# Patient Record
Sex: FEMALE | Race: BLACK OR AFRICAN AMERICAN | Employment: UNEMPLOYED | URBAN - METROPOLITAN AREA
[De-identification: names, ages, dates, MRNs, and addresses within clinical notes are randomized per-mention and may not be internally consistent; named-entity substitution may affect disease eponyms.]

---

## 2017-06-01 ENCOUNTER — OFFICE VISIT (OUTPATIENT)
Dept: FAMILY MEDICINE CLINIC | Age: 57
End: 2017-06-01

## 2017-06-01 VITALS
DIASTOLIC BLOOD PRESSURE: 85 MMHG | BODY MASS INDEX: 41.48 KG/M2 | TEMPERATURE: 98.2 F | HEIGHT: 65 IN | WEIGHT: 249 LBS | SYSTOLIC BLOOD PRESSURE: 132 MMHG

## 2017-06-01 DIAGNOSIS — E11.9 CONTROLLED TYPE 2 DIABETES MELLITUS WITHOUT COMPLICATION, WITHOUT LONG-TERM CURRENT USE OF INSULIN (HCC): ICD-10-CM

## 2017-06-01 DIAGNOSIS — I10 ESSENTIAL HYPERTENSION: Primary | ICD-10-CM

## 2017-06-01 DIAGNOSIS — E78.00 PURE HYPERCHOLESTEROLEMIA: ICD-10-CM

## 2017-06-01 RX ORDER — ATORVASTATIN CALCIUM 80 MG/1
40 TABLET, FILM COATED ORAL DAILY
Qty: 90 TAB | Refills: 3 | Status: SHIPPED | OUTPATIENT
Start: 2017-06-01 | End: 2018-03-07 | Stop reason: SDUPTHER

## 2017-06-01 RX ORDER — AMLODIPINE BESYLATE 5 MG/1
5 TABLET ORAL DAILY
Qty: 30 TAB | Refills: 3 | Status: SHIPPED | OUTPATIENT
Start: 2017-06-01 | End: 2017-07-26 | Stop reason: ALTCHOICE

## 2017-06-01 NOTE — MR AVS SNAPSHOT
Visit Information Date & Time Provider Department Dept. Phone Encounter #  
 6/1/2017  3:15 PM Leonila Olivas, [address] [place] [address] Avenue [phone] [id number] Follow-up Instructions Return in about 8 weeks (around 7/27/2017). 6/7/2017 10:00 AM  
EWL OVER 50 with [place]  [facility] ([address]) [address] [facility] [address] [facility] [address]  
[phone]  
  
   
 [address] [facility]   [facility] [address] [facility] Upcoming Health Maintenance Date Due  
 EYE EXAM RETINAL OR DILATED Q1 5/5/1968 Pneumococcal 19-64 Medium Risk (1 of 1 - PPSV23) 5/5/1977 HEMOGLOBIN A1C Q6M 10/11/2016 MICROALBUMIN Q1 3/10/2017 FOOT EXAM Q1 4/11/2017 LIPID PANEL Q1 4/11/2017 FOBT Q 1 YEAR AGE 50-75 4/11/2017 INFLUENZA AGE 9 TO ADULT 8/1/2017 BREAST CANCER SCRN MAMMOGRAM 3/2/2018 PAP AKA CERVICAL CYTOLOGY 2/16/2019 DTaP/Tdap/Td series (2 - Td) 12/14/2025 Allergies as of 6/1/2017  Review Complete On: 6/1/2017 By: Bala Torres No Known Allergies Current Immunizations  Reviewed on 12/14/2015 Name Date Influenza Vaccine (Quad) PF 12/14/2015 Tdap 12/14/2015 Not reviewed this visit You Were Diagnosed With   
  
 Codes Comments Essential hypertension    -  Primary ICD-10-CM: I10 
ICD-9-CM: 401.9 Pure hypercholesterolemia     ICD-10-CM: E78.00 ICD-9-CM: 272.0 Vitals BP Temp Height(growth percentile) Weight(growth percentile) BMI OB Status 132/85 (BP 1 Location: Left arm, BP Patient Position: Sitting) 98.2 °F (36.8 °C) (Oral) 5' 4.57\" (1.64 m) 249 lb (112.9 kg) 41.99 kg/m2 Postmenopausal  
 Smoking Status Never Smoker Vitals History BMI and BSA Data Body Mass Index Body Surface Area 41.99 kg/m 2 2.27 m 2 Preferred Pharmacy Pharmacy Name Phone  [facility] [address] [phone] Your Updated Medication List  
  
   
This list is accurate as of: 6/1/17  4:25 PM.  Always use your most recent med list. amLODIPine 5 mg tablet Commonly known as:  [place] Coop Take 1 Tab by mouth daily. atorvastatin 80 mg tablet Commonly known as:  LIPITOR Take 0.5 Tabs by mouth daily. Prescriptions Sent to Pharmacy Refills  
 amLODIPine (NORVASC) 5 mg tablet 3 Sig: Take 1 Tab by mouth daily. Class: Normal  
 Pharmacy: [place] [address] [address] [facility] #: [phone] Route: Oral  
 atorvastatin (LIPITOR) 80 mg tablet 3 Sig: Take 0.5 Tabs by mouth daily. Class: Normal  
 Pharmacy: [place] [address] [address] [facility] #: [phone] Route: Oral  
  
Follow-up Instructions Return in about 8 weeks (around 7/27/2017). Patient Instructions Amlodipine (By mouth) Amlodipine (pr-INF-fu-peen) Treats high blood pressure and angina (chest pain). This medicine is a calcium channel blocker. Brand Name(s): Norvasc There may be other brand names for this medicine. When This Medicine Should Not Be Used: This medicine is not right for everyone. Do not use it if you had an allergic reaction to amlodipine. How to Use This Medicine:  
Tablet, Dissolving Tablet · Take your medicine as directed. Your dose may need to be changed several times to find what works best for you. Take this medicine at the same time each day. · Read and follow the patient instructions that come with this medicine. Talk to your doctor or pharmacist if you have any questions. · Missed dose: Take a dose as soon as you remember. If it has been more than 12 hours since you were supposed to take your dose, skip the missed dose and take your next regular dose at the regular time.  
· Store the medicine in a closed container at room temperature, away from heat, moisture, and direct light. Drugs and Foods to Avoid: Ask your doctor or pharmacist before using any other medicine, including over-the-counter medicines, vitamins, and herbal products. · Some medicines can affect how amlodipine works. Tell your doctor if you are also using any of the following: ¨ Clarithromycin, cyclosporine, diltiazem, itraconazole, ritonavir, sildenafil, simvastatin, tacrolimus Warnings While Using This Medicine: · Tell your doctor if you are pregnant or breastfeeding, or if you have liver disease, heart disease, coronary artery disease, or aortic stenosis. · This medicine could lower your blood pressure too much, especially when you first use it or if you are dehydrated. Stand or sit up slowly if you feel lightheaded or dizzy. · Your doctor will check your progress and the effects of this medicine at regular visits. Keep all appointments. · Do not stop using this medicine without asking your doctor, even if you feel well. This medicine will not cure high blood pressure, but it will help keep it in normal range. You may have to take blood pressure medicine for the rest of your life. · Keep all medicine out of the reach of children. Never share your medicine with anyone. Possible Side Effects While Using This Medicine:  
Call your doctor right away if you notice any of these side effects: · Allergic reaction: Itching or hives, swelling in your face or hands, swelling or tingling in your mouth or throat, chest tightness, trouble breathing · Lightheadedness, dizziness · New or worsening chest pain · Swelling in your hands, ankles, or legs · Trouble breathing, nausea, unusual sweating, fainting If you notice other side effects that you think are caused by this medicine, tell your doctor. Call your doctor for medical advice about side effects. You may report side effects to FDA at 8-284-FDA-8252 © 2017 [address] Information is for End User's use only and may not be sold, redistributed or otherwise used for commercial purposes. The above information is an  only. It is not intended as medical advice for individual conditions or treatments. Talk to your doctor, nurse or pharmacist before following any medical regimen to see if it is safe and effective for you. Introducing [facility]! Booker Bnez introduces [employer] patient portal. Now you can access parts of your medical record, email your doctor's office, and request medication refills online. 1. In your internet browser, go to [email or web address] 2. Click on the First Time User? Click Here link in the Sign In box. You will see the New Member Sign Up page. 3. Enter your [employer] Access Code exactly as it appears below. You will not need to use this code after youve completed the sign-up process. If you do not sign up before the expiration date, you must request a new code. · [employer] Access Code: [email or web address] Expires: 6/26/2017  1:54 PM 
 
4. Enter the last four digits of your Social Security Number (xxxx) and Date of Birth (mm/dd/yyyy) as indicated and click Submit. You will be taken to the next sign-up page. 5. Create a Un[employer] ID. This will be your [employer] login ID and cannot be changed, so think of one that is secure and easy to remember. 6. Create a [employer] password. You can change your password at any time. 7. Enter your Password Reset Question and Answer. This can be used at a later time if you forget your password. 8. Enter your e-mail address. You will receive e-mail notification when new information is available in [address] 9. Click Sign Up. You can now view and download portions of your medical record. 10. Click the Download Summary menu link to download a portable copy of your medical information.  
 
If you have questions, please visit the Frequently Asked Questions section of the [employer]. Remember, [employer] is NOT to be used for urgent needs. For medical emergencies, dial 911. Now available from your iPhone and Android! Please provide this summary of care documentation to your next provider. Your primary care clinician is listed as Ernst Araujo. If you have any questions after today's visit, please call [phone].

## 2017-06-01 NOTE — PATIENT INSTRUCTIONS
Amlodipine (By mouth)   Amlodipine (hg-IMH-my-peen)  Treats high blood pressure and angina (chest pain). This medicine is a calcium channel blocker. Brand Name(s): Norvasc   There may be other brand names for this medicine. When This Medicine Should Not Be Used: This medicine is not right for everyone. Do not use it if you had an allergic reaction to amlodipine. How to Use This Medicine:   Tablet, Dissolving Tablet  · Take your medicine as directed. Your dose may need to be changed several times to find what works best for you. Take this medicine at the same time each day. · Read and follow the patient instructions that come with this medicine. Talk to your doctor or pharmacist if you have any questions. · Missed dose: Take a dose as soon as you remember. If it has been more than 12 hours since you were supposed to take your dose, skip the missed dose and take your next regular dose at the regular time. · Store the medicine in a closed container at room temperature, away from heat, moisture, and direct light. Drugs and Foods to Avoid:   Ask your doctor or pharmacist before using any other medicine, including over-the-counter medicines, vitamins, and herbal products. · Some medicines can affect how amlodipine works. Tell your doctor if you are also using any of the following:   ¨ Clarithromycin, cyclosporine, diltiazem, itraconazole, ritonavir, sildenafil, simvastatin, tacrolimus  Warnings While Using This Medicine:   · Tell your doctor if you are pregnant or breastfeeding, or if you have liver disease, heart disease, coronary artery disease, or aortic stenosis. · This medicine could lower your blood pressure too much, especially when you first use it or if you are dehydrated. Stand or sit up slowly if you feel lightheaded or dizzy. · Your doctor will check your progress and the effects of this medicine at regular visits. Keep all appointments.   · Do not stop using this medicine without asking your doctor, even if you feel well. This medicine will not cure high blood pressure, but it will help keep it in normal range. You may have to take blood pressure medicine for the rest of your life. · Keep all medicine out of the reach of children. Never share your medicine with anyone. Possible Side Effects While Using This Medicine:   Call your doctor right away if you notice any of these side effects:  · Allergic reaction: Itching or hives, swelling in your face or hands, swelling or tingling in your mouth or throat, chest tightness, trouble breathing  · Lightheadedness, dizziness  · New or worsening chest pain  · Swelling in your hands, ankles, or legs  · Trouble breathing, nausea, unusual sweating, fainting  If you notice other side effects that you think are caused by this medicine, tell your doctor. Call your doctor for medical advice about side effects. You may report side effects to FDA at 0-022-FDA-1460  © 2017 [address] Information is for End User's use only and may not be sold, redistributed or otherwise used for commercial purposes. The above information is an  only. It is not intended as medical advice for individual conditions or treatments. Talk to your doctor, nurse or pharmacist before following any medical regimen to see if it is safe and effective for you.

## 2017-06-01 NOTE — PROGRESS NOTES
Coordination of Care  1. Have you been to the ER, urgent care clinic since your last visit? Hospitalized since your last visit? No    2. Have you seen or consulted any other health care providers outside of the [address] [place] [address] since your last visit? Include any pap smears or colon screening. No    Medications  Medication Reconciliation Performed: yes  Patient does need refills     Learning Assessment Complete?  yes

## 2017-06-01 NOTE — PROGRESS NOTES
Subjective:     Oscar Ho is a 59 y.o. female who presents for follow up of diabetes, hypertension and hyperlipidemia. Diet and Lifestyle: not attempting to follow a low fat, low cholesterol diet  Home BP Monitoring: is not measured at home    Cardiovascular ROS: taking medications as instructed, no medication side effects noted, no TIA's, no chest pain on exertion, no dyspnea on exertion, no swelling of ankles. New concerns:   Patient Active Problem List   Diagnosis Code    Hyperlipidemia E78.5    Essential hypertension I10    [facility] E66.01    Diabetes mellitus type 2, controlled ([facility] E11.9     Patient Active Problem List    Diagnosis Date Noted    Diabetes mellitus type 2, controlled ([facility] 01/08/2016    Morbid obesity ([facility] 12/15/2015    Hyperlipidemia 07/02/2015    Essential hypertension 07/02/2015     Current Outpatient Prescriptions   Medication Sig Dispense Refill    atorvastatin (LIPITOR) 80 mg tablet Take 0.5 Tabs by mouth daily. 90 Tab 3    hydrochlorothiazide (HYDRODIURIL) 25 mg tablet Take 1 Tab by mouth daily. 90 Tab 0     No Known Allergies     Lab Results   Component Value Date/Time    Sodium 142 04/11/2016 11:49 AM    Potassium 4.1 04/11/2016 11:49 AM    Chloride 103 04/11/2016 11:49 AM    CO2 31 04/11/2016 11:49 AM    Anion gap 8 04/11/2016 11:49 AM    Glucose 89 04/11/2016 11:49 AM    BUN 11 04/11/2016 11:49 AM    Creatinine 0.87 04/11/2016 11:49 AM    BUN/Creatinine ratio 13 04/11/2016 11:49 AM    GFR est AA >60 04/11/2016 11:49 AM    GFR est non-AA >60 04/11/2016 11:49 AM    Calcium 8.6 04/11/2016 11:49 AM    Bilirubin, total 0.4 01/07/2016 10:46 AM    ALT (SGPT) 22 01/07/2016 10:46 AM    AST (SGOT) 12 01/07/2016 10:46 AM    Alk.  phosphatase 77 01/07/2016 10:46 AM    Protein, total 7.6 01/07/2016 10:46 AM    Albumin 4.0 01/07/2016 10:46 AM    Globulin 3.6 01/07/2016 10:46 AM    A-G Ratio 1.1 01/07/2016 10:46 AM      Lab Results   Component Value Date/Time Hemoglobin A1c 6.3 04/11/2016 11:49 AM         Review of Systems, additional:  Pertinent items are noted in HPI. Objective:     Visit Vitals    /85 (BP 1 Location: Left arm, BP Patient Position: Sitting)    Temp 98.2 °F (36.8 °C) (Oral)    Ht 5' 4.57\" (1.64 m)    Wt 249 lb (112.9 kg)    BMI 41.99 kg/m2     Appearance: alert, well appearing, and in no distress, oriented to person, place, and time, overweight, acyanotic, in no respiratory distress, playful, active and well hydrated. General exam: CVS exam BP noted to be well controlled today in office, S1, S2 normal, no gallop, no murmur, chest clear, no JVD, no HSM, no edema. Lab review: orders written for new lab studies as appropriate; see orders. Assessment/Plan:     hypertension stable, asymptomatic, control uncertain, hyperlipidemia needs further observation. current treatment plan is effective, no change in therapy  orders and follow up as documented in patient record  reviewed diet, exercise and weight control  recommended sodium restriction. ICD-10-CM ICD-9-CM    1. Essential hypertension I10 401.9 amLODIPine (NORVASC) 5 mg tablet   2. Pure hypercholesterolemia E78.00 272.0 atorvastatin (LIPITOR) 80 mg tablet     BP at goal despite Pt having lack of HTN Rx for > 1-2 months. Refill Rx. Encouraged Pt/dtr to check BP at home/pharmacy > 2-3x week and bring log to f/u appt. Return for fasting labs prior to f/u appt. Pt expressed understanding of the treatment plan and repeated back instructions for medications, labs and follow-up.

## 2017-06-07 ENCOUNTER — HOSPITAL ENCOUNTER (OUTPATIENT)
Dept: MAMMOGRAPHY | Age: 57
Discharge: HOME | End: 2017-06-07

## 2017-06-07 ENCOUNTER — OFFICE VISIT (OUTPATIENT)
Dept: FAMILY PLANNING/WOMEN'S HEALTH CLINIC | Age: 57
End: 2017-06-07

## 2017-06-07 VITALS — SYSTOLIC BLOOD PRESSURE: 124 MMHG | DIASTOLIC BLOOD PRESSURE: 80 MMHG

## 2017-06-07 DIAGNOSIS — B37.31 VAGINAL YEAST INFECTION: Primary | ICD-10-CM

## 2017-06-07 DIAGNOSIS — Z12.31 VISIT FOR SCREENING MAMMOGRAM: ICD-10-CM

## 2017-06-07 PROCEDURE — 77067 SCR MAMMO BI INCL CAD: CPT

## 2017-06-07 RX ORDER — MICONAZOLE NITRATE 2 %
1 CREAM WITH APPLICATOR VAGINAL
Qty: 45 G | Refills: 0 | Status: SHIPPED | OUTPATIENT
Start: 2017-06-07 | End: 2018-03-07

## 2017-06-07 NOTE — PROGRESS NOTES
EVERY WOMANS LIFE HISTORY QUESTIONNAIRE       No Yes Comments   Has a doctor ever seen or felt anything wrong with your breast? [x]                                  []                                     Have you ever had a breast biopsy? [x]                                  []                                          When and where was last mammogram performed? 3/2016 with [facility]    Have you ever been told that there was a problem on your mammogram?   No Yes Comments   [x]                                  []                                       Do you have breast implants? No Yes Comments   [x]                                  []                                       When was your last Pap test performed? 2/2016 at [facility] you ever had an abnormal Pap test?   No Yes Comments   [x]                                  []                                       Have you had a hysterectomy? No Yes Comments (why)   [x]                                  []                                       Have you ever been diagnosed with any type of Cancer   No Yes Comments (type,when,where,type of treatment   [x]                                  []                                          Has a family member been diagnosed with breast or ovarian cancer? No Yes Comments (which family members, and type   [x]                                  []                                       Did your mother take JUVEANL? No Yes Unknown   [x]                                  []                                       Do you have a history of HIV exposure? No Yes    [x]                                  []                                         Have you been through menopause?    No Yes Date of LMP   []                                  [x]                                  2014     Are you taking hormone replacement therapy (HRT)     No Yes Comments   [x]                                  []                                     How many times have you been pregnant? 5      Number of live births ? 5    Are you experiencing any of the following? No Yes Comments   Nipple Discharge [x]                                  []                                     Breast Lump/Masses [x]                                  []                                     Breast Skin Changes [x]                                  []                                          No Yes Comments   Vaginal Discharge [x]                                  []                                     Abnormal/unusual vaginal bleeding [x]                                  []                                         Are you experiencing any other health problems?     HTN, high chol---followed at [facility]

## 2017-06-07 NOTE — PROGRESS NOTES
Assessment/Plan:    Dollie Sicard was seen today for well woman. Diagnoses and all orders for this visit:    Vaginal yeast infection  -     miconazole (MONISTAT 7) 2 % vaginal cream; Insert 1 Applicator into vagina nightly. She has f/up with her PCP scheduled- encouraged her to keep appt    Follow-up Disposition: Not on File    [facility]. An AVS was printed and given to the patient.      ----------------------------------------------------------------------    Chief Complaint   Patient presents with    Well Woman     EWL visit       History of Present Illness:   all vaginal deliveries  Last menstrual period about 3 years ago, has had endometrial bx due to vaginal spotting some time before her last period (?) and it was reported to be normal to the pt  Has no new breast complaints. Last mammo one year ago  Last pap one year ago  Not sexually active  Has new complaint \"for some long time\" of vaginal itching. We discussed how this is often caused when blood sugars go up. She has f/up next month for elevated blood sugar        Past Medical History:   Diagnosis Date    Essential hypertension     Hyperlipidemia     [facility]     Type 2 diabetes mellitus ([facility] dx 2016    with a1c 6.7%       Current Outpatient Prescriptions   Medication Sig Dispense Refill    miconazole (MONISTAT 7) 2 % vaginal cream Insert 1 Applicator into vagina nightly. 45 g 0    amLODIPine (NORVASC) 5 mg tablet Take 1 Tab by mouth daily. 30 Tab 3    atorvastatin (LIPITOR) 80 mg tablet Take 0.5 Tabs by mouth daily. 90 Tab 3       No Known Allergies    Social History   Substance Use Topics    Smoking status: Never Smoker    Smokeless tobacco: Never Used    Alcohol use No       No family history on file.     Physical Exam:     Visit Vitals    /80       A&Ox3  WDWN NAD  Respirations normal and non labored  Breast exam- neg anastacio for mass, dimpling, skin color changes, nipple changes  abd- obese  Pelvic exam- ext neg for lesion or discharge.  Vaginal vault florid yeast and tender with exam  Cervix is neg for lesions  Uterus and adnexal exam neg for mass but tender due to yeast vaginitis  Rectal exam- neg heme guaiac

## 2017-07-12 ENCOUNTER — HOSPITAL ENCOUNTER (OUTPATIENT)
Dept: LAB | Age: 57
Discharge: HOME | End: 2017-07-12

## 2017-07-12 ENCOUNTER — CLINICAL SUPPORT (OUTPATIENT)
Dept: FAMILY MEDICINE CLINIC | Age: 57
End: 2017-07-12

## 2017-07-12 DIAGNOSIS — E78.00 PURE HYPERCHOLESTEROLEMIA: ICD-10-CM

## 2017-07-12 DIAGNOSIS — I10 ESSENTIAL HYPERTENSION: ICD-10-CM

## 2017-07-12 DIAGNOSIS — E11.9 CONTROLLED TYPE 2 DIABETES MELLITUS WITHOUT COMPLICATION, WITHOUT LONG-TERM CURRENT USE OF INSULIN (HCC): ICD-10-CM

## 2017-07-12 LAB
ALBUMIN SERPL BCP-MCNC: 3.7 G/DL (ref 3.5–5)
ALBUMIN/GLOB SERPL: 1.1 {RATIO} (ref 1.1–2.2)
ALP SERPL-CCNC: 92 U/L (ref 45–117)
ALT SERPL-CCNC: 22 U/L (ref 12–78)
ANION GAP BLD CALC-SCNC: 8 MMOL/L (ref 5–15)
AST SERPL W P-5'-P-CCNC: 10 U/L (ref 15–37)
BILIRUB SERPL-MCNC: 0.5 MG/DL (ref 0.2–1)
BUN SERPL-MCNC: 12 MG/DL (ref 6–20)
BUN/CREAT SERPL: 14 (ref 12–20)
CALCIUM SERPL-MCNC: 9.1 MG/DL (ref 8.5–10.1)
CHLORIDE SERPL-SCNC: 105 MMOL/L (ref 97–108)
CHOLEST SERPL-MCNC: 201 MG/DL
CO2 SERPL-SCNC: 29 MMOL/L (ref 21–32)
CREAT SERPL-MCNC: 0.87 MG/DL (ref 0.55–1.02)
ERYTHROCYTE [DISTWIDTH] IN BLOOD BY AUTOMATED COUNT: 13.6 % (ref 11.5–14.5)
EST. AVERAGE GLUCOSE BLD GHB EST-MCNC: 140 MG/DL
GLOBULIN SER CALC-MCNC: 3.5 G/DL (ref 2–4)
GLUCOSE SERPL-MCNC: 88 MG/DL (ref 65–100)
HBA1C MFR BLD: 6.5 % (ref 4.2–6.3)
HCT VFR BLD AUTO: 41.8 % (ref 35–47)
HDLC SERPL-MCNC: 70 MG/DL
HDLC SERPL: 2.9 {RATIO} (ref 0–5)
HGB BLD-MCNC: 13.6 G/DL (ref 11.5–16)
LDLC SERPL CALC-MCNC: 112.4 MG/DL (ref 0–100)
LIPID PROFILE,FLP: ABNORMAL
MCH RBC QN AUTO: 26.4 PG (ref 26–34)
MCHC RBC AUTO-ENTMCNC: 32.5 G/DL (ref 30–36.5)
MCV RBC AUTO: 81 FL (ref 80–99)
PLATELET # BLD AUTO: 248 K/UL (ref 150–400)
POTASSIUM SERPL-SCNC: 4.4 MMOL/L (ref 3.5–5.1)
PROT SERPL-MCNC: 7.2 G/DL (ref 6.4–8.2)
RBC # BLD AUTO: 5.16 M/UL (ref 3.8–5.2)
SODIUM SERPL-SCNC: 142 MMOL/L (ref 136–145)
TRIGL SERPL-MCNC: 93 MG/DL (ref ?–150)
TSH SERPL DL<=0.05 MIU/L-ACNC: 1.9 UIU/ML (ref 0.36–3.74)
VLDLC SERPL CALC-MCNC: 18.6 MG/DL
WBC # BLD AUTO: 3.7 K/UL (ref 3.6–11)

## 2017-07-12 PROCEDURE — 80061 LIPID PANEL: CPT | Performed by: FAMILY MEDICINE

## 2017-07-12 PROCEDURE — 84443 ASSAY THYROID STIM HORMONE: CPT | Performed by: FAMILY MEDICINE

## 2017-07-12 PROCEDURE — 85027 COMPLETE CBC AUTOMATED: CPT | Performed by: FAMILY MEDICINE

## 2017-07-12 PROCEDURE — 83036 HEMOGLOBIN GLYCOSYLATED A1C: CPT | Performed by: FAMILY MEDICINE

## 2017-07-12 PROCEDURE — 80053 COMPREHEN METABOLIC PANEL: CPT | Performed by: FAMILY MEDICINE

## 2017-07-26 ENCOUNTER — OFFICE VISIT (OUTPATIENT)
Dept: FAMILY MEDICINE CLINIC | Age: 57
End: 2017-07-26

## 2017-07-26 VITALS
DIASTOLIC BLOOD PRESSURE: 67 MMHG | SYSTOLIC BLOOD PRESSURE: 111 MMHG | WEIGHT: 253.2 LBS | BODY MASS INDEX: 42.7 KG/M2 | HEART RATE: 71 BPM | TEMPERATURE: 98.2 F

## 2017-07-26 DIAGNOSIS — Z13.1 SCREENING FOR DIABETES MELLITUS (DM): Primary | ICD-10-CM

## 2017-07-26 DIAGNOSIS — E11.9 CONTROLLED TYPE 2 DIABETES MELLITUS WITHOUT COMPLICATION, WITHOUT LONG-TERM CURRENT USE OF INSULIN (HCC): ICD-10-CM

## 2017-07-26 DIAGNOSIS — I10 ESSENTIAL HYPERTENSION: ICD-10-CM

## 2017-07-26 LAB — GLUCOSE POC: NORMAL MG/DL

## 2017-07-26 NOTE — MR AVS SNAPSHOT
Visit Information Date & Time Provider Department Dept. Phone Encounter #  
 7/26/2017  9:10 AM Bhupendra Kan NP [facility] [phone] [id number] Follow-up Instructions Return in about 3 months (around 10/26/2017). Upcoming Health Maintenance Date Due  
 EYE EXAM RETINAL OR DILATED Q1 5/5/1968 Pneumococcal 19-64 Medium Risk (1 of 1 - PPSV23) 5/5/1977 MICROALBUMIN Q1 3/10/2017 FOOT EXAM Q1 4/11/2017 FOBT Q 1 YEAR AGE 50-75 4/11/2017 INFLUENZA AGE 9 TO ADULT 8/1/2017 HEMOGLOBIN A1C Q6M 1/12/2018 LIPID PANEL Q1 7/12/2018 PAP AKA CERVICAL CYTOLOGY 2/16/2019 BREAST CANCER SCRN MAMMOGRAM 6/7/2019 DTaP/Tdap/Td series (2 - Td) 12/14/2025 Allergies as of 7/26/2017  Review Complete On: 7/26/2017 By: Bhupendra Kan NP No Known Allergies Current Immunizations  Reviewed on 12/14/2015 Name Date Influenza Vaccine (Quad) PF 12/14/2015 Tdap 12/14/2015 Not reviewed this visit You Were Diagnosed With   
  
 Codes Comments Screening for diabetes mellitus (DM)    -  Primary ICD-10-CM: Z13.1 ICD-9-CM: V77.1 Controlled type 2 diabetes mellitus without complication, without long-term current use of insulin ([facility]     ICD-10-CM: E11.9 ICD-9-CM: 250.00 Essential hypertension     ICD-10-CM: I10 
ICD-9-CM: 401.9 Vitals BP Pulse Temp Weight(growth percentile) BMI OB Status 111/67 (BP 1 Location: Left arm, BP Patient Position: Sitting) 71 98.2 °F (36.8 °C) (Oral) 253 lb 3.2 oz (114.9 kg) 42.7 kg/m2 Postmenopausal  
 Smoking Status Never Smoker Vitals History BMI and BSA Data Body Mass Index Body Surface Area 42.7 kg/m 2 2.29 m 2 Preferred Pharmacy Pharmacy Name Phone [facility] [address] [place] [address] [phone] Your Updated Medication List  
  
   
This list is accurate as of: 7/26/17  9:47 AM.  Always use your most recent med list.  
  
  
  
  
 atorvastatin 80 mg tablet Commonly known as:  LIPITOR Take 0.5 Tabs by mouth daily. miconazole 2 % vaginal cream  
Commonly known as:  MONISTAT 7 Insert 1 Applicator into vagina nightly. We Performed the Following AMB POC GLUCOSE BLOOD, BY GLUCOSE MONITORING DEVICE [44475 CPT(R)] Follow-up Instructions Return in about 3 months (around 10/26/2017). Patient Instructions Learning About Obesity What is obesity? Obesity means having so much body fat that your health is in danger. Having too much body fat can lead to type 2 diabetes, heart disease, high blood pressure, arthritis, sleep apnea, and stroke. Even if you don't feel bad now, think about these health risks. Do they seem like a good reason to start on a new path toward a healthier weight? Or do you have another personal, powerful reason for wanting to lose weight? Whatever it is, keep it in mind. It can be hard to change eating habits and exercise habits. But with your own reason and plan, you can do it. How do you know if your weight is in the obesity range? To know if your weight is in the obesity range, your doctor looks at your body mass index (BMI) and waist size. Your BMI is a number that is calculated from your weight and your height. To figure your BMI for yourself, get a BMI table from your doctor or use an online tool, such as http://www.jimenez.com/ on the [employer] of [employer]. What causes obesity? When you take in more calories than you burn off, you gain weight. How you eat, how active you are, and other things affect how your body uses calories and whether you gain weight. If you have family members who have too much body fat, you may have inherited a tendency to gain weight. And your family also helps form your eating and lifestyle habits, which can lead to obesity. Also, our busy lives make it harder to plan and cook healthy meals. For many of us, it's easier to reach for prepared foods, go out to eat, or go to the drive-through. But these foods are often high in saturated fat and calories. Portions are often too large. What can you do to reach a healthy weight? Focus on health, not diets. Diets are hard to stay on and don't work in the long run. It is very hard to stay with a diet that includes lots of big changes in your eating habits. Instead of a diet, focus on lifestyle changes that will improve your health and achieve the right balance of energy and calories. To lose weight, you need to burn more calories than you take in. You can do it by eating healthy foods in reasonable amounts and becoming more active, even a little bit every day. Making small changes over time can add up to a lot. Make a plan for change. Many people have found that naming their reasons for change and staying focused on their plan can make a big difference. Work with your doctor to create a plan that is right for you. · Ask yourself: Betsy Enciso are my personal, most powerful reasons for wanting this change? What will my life look like when I've made the change? \" · Set your long-term goal. Make it specific, such as \"I will lose x pounds. \" 
· Break your long-term goal into smaller, short-term goals. Make these small steps specific and within your reach, things you know you can do. These steps are what keep you going from day to day. How can you stay on your plan for change? Be ready. Choose to start during a time when there are few events that might trigger slip-ups, like holidays, social events, and high-stress periods. Decide on your first few steps. Most people have more success when they make small changes, one step at a time.  For example, you might switch a daily candy bar to a piece of fruit, walk 10 minutes more, or add more vegetables to a meal. 
 Line up your support people. Make sure you're not going to be alone as you make this change. Connect with people who understand how important it is to you. Ask family members and friends for help in keeping with your plan. And think about who could make it harder for you, and how to handle them. Try tracking. People who keep track of what they eat, feel, and do are better at losing weight. Try writing down things like: · What and how much you eat. · How you feel before and after each meal. 
· Details about each meal (like eating out or at home, eating alone, or with friends or family). · What you do to be active. Look and plan. As you track, look for patterns that you may want to change. Take note of: · When you eat and whether you skip meals. · How often you eat out. · How many fruits and vegetables you eat. · When you eat beyond feeling full. · When and why you eat for reasons other than being hungry. When you stray from your plan, don't get upset. Figure out what made you slip up and how you can fix it. Can you take medicines or have surgery to lose weight? Before your doctor will prescribe medicines or surgery, he or she will probably want you to be more active and follow your healthy eating plan for a period of time. These habits are key lifelong changes for managing your weight, with or without other medical treatment. And these changes can help you avoid weight-related health problems. Follow-up care is a key part of your treatment and safety. Be sure to make and go to all appointments, and call your doctor if you are having problems. It's also a good idea to know your test results and keep a list of the medicines you take. Where can you learn more? Go to http://anselmo-anne.info/. Enter N111 in the search box to learn more about \"Learning About Obesity. \" Current as of: October 13, 2016 Content Version: 11.3 © 6516-1092 Healthwise, Incorporated. Care instructions adapted under license by [employer] (which disclaims liability or warranty for this information). If you have questions about a medical condition or this instruction, always ask your healthcare professional. [address] any warranty or liability for your use of this information. Introducing [facility]! Jeni Obando introduces Vusay patient portal. Now you can access parts of your medical record, email your doctor's office, and request medication refills online. 1. In your internet browser, go to [email or web address]/[email or web address]` 2. Click on the First Time User? Click Here link in the Sign In box. You will see the New Member Sign Up page. 3. Enter your Vusay Access Code exactly as it appears below. You will not need to use this code after youve completed the sign-up process. If you do not sign up before the expiration date, you must request a new code. · Vusay Access Code: [email or web address] Expires: 10/24/2017  9:47 AM 
 
4. Enter the last four digits of your Social Security Number (xxxx) and Date of Birth (mm/dd/yyyy) as indicated and click Submit. You will be taken to the next sign-up page. 5. Create a Vusay ID. This will be your Vusay login ID and cannot be changed, so think of one that is secure and easy to remember. 6. Create a Vusay password. You can change your password at any time. 7. Enter your Password Reset Question and Answer. This can be used at a later time if you forget your password. 8. Enter your e-mail address. You will receive e-mail notification when new information is available in [address] 9. Click Sign Up. You can now view and download portions of your medical record. 10. Click the Download Summary menu link to download a portable copy of your medical information.  
 
If you have questions, please visit the Frequently Asked Questions section of the [facility]. Remember, [employer] is NOT to be used for urgent needs. For medical emergencies, dial 911. Now available from your iPhone and Android! Please provide this summary of care documentation to your next provider. Your primary care clinician is listed as Josiane Cardenas. Carol Stevens. If you have any questions after today's visit, please call [phone].

## 2017-07-26 NOTE — PROGRESS NOTES
Subjective:     Chief Complaint   Patient presents with    Follow-up        She  is a 59 y.o. female who presents for evaluation of HTN and DM 2. Since LOV, Pt has run of HTN Rx since she was unware of refill status. Has attempted lifestyle changes and weight loss in the past.     Declines interest in Metformin. Norvasc was well tolerated. ROS  Gen - no fever/chills  Resp - no dyspnea or cough  CV - no chest pain or ROBERTS  Rest per HPI    Past Medical History:   Diagnosis Date    Essential hypertension     Hyperlipidemia     Morbid [facility] ([facility]     Type 2 diabetes mellitus ([facility] dx 1/2016    with a1c 6.7%     No past surgical history on file. Current Outpatient Prescriptions on File Prior to Visit   Medication Sig Dispense Refill    amLODIPine (NORVASC) 5 mg tablet Take 1 Tab by mouth daily. 30 Tab 3    atorvastatin (LIPITOR) 80 mg tablet Take 0.5 Tabs by mouth daily. 90 Tab 3    miconazole (MONISTAT 7) 2 % vaginal cream Insert 1 Applicator into vagina nightly. 45 g 0     No current facility-administered medications on file prior to visit.          Objective:     Vitals:    07/26/17 0919   BP: 111/67   Pulse: 71   Temp: 98.2 °F (36.8 °C)   TempSrc: Oral   Weight: 253 lb 3.2 oz (114.9 kg)       Physical Examination:  General appearance - alert, well appearing, and in no distress  Eyes -sclera anicteric  Neck - supple, no significant adenopathy, no thyromegaly  Chest - clear to auscultation, no wheezes, rales or rhonchi, symmetric air entry  Heart - normal rate, regular rhythm, normal S1, S2, no murmurs, rubs, clicks or gallops  Neurological - alert, oriented, no focal findings or movement disorder noted  Abdomen-BS present/WNL x 4 quads, non-tender/distended, soft,no organomegaly    Recent Results (from the past 12 hour(s))   AMB POC GLUCOSE BLOOD, BY GLUCOSE MONITORING DEVICE    Collection Time: 07/26/17  9:26 AM   Result Value Ref Range    Glucose  NF mg/dL       Assessment/ Plan: Diagnoses and all orders for this visit:    1. Screening for diabetes mellitus (DM)  -     AMB POC GLUCOSE BLOOD, BY GLUCOSE MONITORING DEVICE    2. Controlled type 2 diabetes mellitus without complication, without long-term current use of insulin ([facility]    3. Essential hypertension       Counseled Pt/sister on importance of DM control and if Pt has diff losing weight, to call back and I would send Rx for 1 tab daily of Metformin. Check BP and if > 140 SBP, resume Norvasc. RTC in 3 months for repeat A1C/weight check. I have discussed the diagnosis with the patient and the intended plan as seen in the above orders. The patient has received an after-visit summary and questions were answered concerning future plans. I have discussed medication side effects and warnings with the patient as well. The patient verbalizes understanding and agreement with the plan. Follow-up Disposition:  Return in about 3 months (around 10/26/2017).

## 2017-07-26 NOTE — PATIENT INSTRUCTIONS
Learning About Obesity  What is obesity? Obesity means having so much body fat that your health is in danger. Having too much body fat can lead to type 2 diabetes, heart disease, high blood pressure, arthritis, sleep apnea, and stroke. Even if you don't feel bad now, think about these health risks. Do they seem like a good reason to start on a new path toward a healthier weight? Or do you have another personal, powerful reason for wanting to lose weight? Whatever it is, keep it in mind. It can be hard to change eating habits and exercise habits. But with your own reason and plan, you can do it. How do you know if your weight is in the obesity range? To know if your weight is in the obesity range, your doctor looks at your body mass index (BMI) and waist size. Your BMI is a number that is calculated from your weight and your height. To figure your BMI for yourself, get a BMI table from your doctor or use an online tool, such as http://www.[employer]com/ on the [employer] of [employer]. What causes obesity? When you take in more calories than you burn off, you gain weight. How you eat, how active you are, and other things affect how your body uses calories and whether you gain weight. If you have family members who have too much body fat, you may have inherited a tendency to gain weight. And your family also helps form your eating and lifestyle habits, which can lead to obesity. Also, our busy lives make it harder to plan and cook healthy meals. For many of us, it's easier to reach for prepared foods, go out to eat, or go to the drive-through. But these foods are often high in saturated fat and calories. Portions are often too large. What can you do to reach a healthy weight? Focus on health, not diets. Diets are hard to stay on and don't work in the long run.  It is very hard to stay with a diet that includes lots of big changes in your eating habits. Instead of a diet, focus on lifestyle changes that will improve your health and achieve the right balance of energy and calories. To lose weight, you need to burn more calories than you take in. You can do it by eating healthy foods in reasonable amounts and becoming more active, even a little bit every day. Making small changes over time can add up to a lot. Make a plan for change. Many people have found that naming their reasons for change and staying focused on their plan can make a big difference. Work with your doctor to create a plan that is right for you. · Ask yourself: Ashley Maynard are my personal, most powerful reasons for wanting this change? What will my life look like when I've made the change? \"  · Set your long-term goal. Make it specific, such as \"I will lose x pounds. \"  · Break your long-term goal into smaller, short-term goals. Make these small steps specific and within your reach, things you know you can do. These steps are what keep you going from day to day. How can you stay on your plan for change? Be ready. Choose to start during a time when there are few events that might trigger slip-ups, like holidays, social events, and high-stress periods. Decide on your first few steps. Most people have more success when they make small changes, one step at a time. For example, you might switch a daily candy bar to a piece of fruit, walk 10 minutes more, or add more vegetables to a meal.  Line up your support people. Make sure you're not going to be alone as you make this change. Connect with people who understand how important it is to you. Ask family members and friends for help in keeping with your plan. And think about who could make it harder for you, and how to handle them. Try tracking. People who keep track of what they eat, feel, and do are better at losing weight. Try writing down things like:  · What and how much you eat.   · How you feel before and after each meal.  · Details about each meal (like eating out or at home, eating alone, or with friends or family). · What you do to be active. Look and plan. As you track, look for patterns that you may want to change. Take note of:  · When you eat and whether you skip meals. · How often you eat out. · How many fruits and vegetables you eat. · When you eat beyond feeling full. · When and why you eat for reasons other than being hungry. When you stray from your plan, don't get upset. Figure out what made you slip up and how you can fix it. Can you take medicines or have surgery to lose weight? Before your doctor will prescribe medicines or surgery, he or she will probably want you to be more active and follow your healthy eating plan for a period of time. These habits are key lifelong changes for managing your weight, with or without other medical treatment. And these changes can help you avoid weight-related health problems. Follow-up care is a key part of your treatment and safety. Be sure to make and go to all appointments, and call your doctor if you are having problems. It's also a good idea to know your test results and keep a list of the medicines you take. Where can you learn more? Go to http://anselmo-anne.info/. Enter N111 in the search box to learn more about \"Learning About Obesity. \"  Current as of: October 13, 2016  Content Version: 11.3  © 1493-0334 [employer]. Care instructions adapted under license by [employer] (which disclaims liability or warranty for this information). If you have questions about a medical condition or this instruction, always ask your healthcare professional. [address] any warranty or liability for your use of this information.

## 2017-07-26 NOTE — PROGRESS NOTES
Coordination of Care  1. Have you been to the ER, urgent care clinic since your last visit? Hospitalized since your last visit? No    2. Have you seen or consulted any other health care providers outside of the [address] [place] [address] since your last visit? Include any pap smears or colon screening.  No    Medications  Medication Reconciliation Performed: yes  Patient does need refills     Learning Assessment Complete? yes  Results for orders placed or performed in visit on 07/26/17   AMB POC GLUCOSE BLOOD, BY GLUCOSE MONITORING DEVICE   Result Value Ref Range    Glucose  NF mg/dL

## 2018-03-07 ENCOUNTER — HOSPITAL ENCOUNTER (OUTPATIENT)
Dept: LAB | Age: 58
Discharge: HOME | End: 2018-03-07

## 2018-03-07 ENCOUNTER — OFFICE VISIT (OUTPATIENT)
Dept: FAMILY MEDICINE CLINIC | Age: 58
End: 2018-03-07

## 2018-03-07 VITALS
BODY MASS INDEX: 43.34 KG/M2 | DIASTOLIC BLOOD PRESSURE: 77 MMHG | HEART RATE: 82 BPM | SYSTOLIC BLOOD PRESSURE: 119 MMHG | TEMPERATURE: 98.2 F | WEIGHT: 257 LBS

## 2018-03-07 DIAGNOSIS — E11.9 DIABETES MELLITUS TYPE 2, DIET-CONTROLLED (HCC): ICD-10-CM

## 2018-03-07 DIAGNOSIS — Z23 ENCOUNTER FOR IMMUNIZATION: ICD-10-CM

## 2018-03-07 DIAGNOSIS — E66.01 OBESITY, CLASS III, BMI 40-49.9 (MORBID OBESITY) (HCC): ICD-10-CM

## 2018-03-07 DIAGNOSIS — E78.2 MIXED HYPERLIPIDEMIA: ICD-10-CM

## 2018-03-07 DIAGNOSIS — E11.9 DIABETES MELLITUS TYPE 2, DIET-CONTROLLED (HCC): Primary | ICD-10-CM

## 2018-03-07 LAB
ANION GAP SERPL CALC-SCNC: 7 MMOL/L (ref 5–15)
BUN SERPL-MCNC: 12 MG/DL (ref 6–20)
BUN/CREAT SERPL: 13 (ref 12–20)
CALCIUM SERPL-MCNC: 9.3 MG/DL (ref 8.5–10.1)
CHLORIDE SERPL-SCNC: 104 MMOL/L (ref 97–108)
CO2 SERPL-SCNC: 29 MMOL/L (ref 21–32)
CREAT SERPL-MCNC: 0.91 MG/DL (ref 0.55–1.02)
GLUCOSE POC: NORMAL MG/DL
GLUCOSE SERPL-MCNC: 95 MG/DL (ref 65–100)
POTASSIUM SERPL-SCNC: 4.3 MMOL/L (ref 3.5–5.1)
SODIUM SERPL-SCNC: 140 MMOL/L (ref 136–145)

## 2018-03-07 PROCEDURE — 83036 HEMOGLOBIN GLYCOSYLATED A1C: CPT | Performed by: FAMILY MEDICINE

## 2018-03-07 PROCEDURE — 80048 BASIC METABOLIC PNL TOTAL CA: CPT | Performed by: FAMILY MEDICINE

## 2018-03-07 RX ORDER — ATORVASTATIN CALCIUM 80 MG/1
40 TABLET, FILM COATED ORAL DAILY
Qty: 90 TAB | Refills: 3 | Status: SHIPPED | OUTPATIENT
Start: 2018-03-07 | End: 2019-06-05 | Stop reason: SDUPTHER

## 2018-03-07 NOTE — PATIENT INSTRUCTIONS
Noninsulin Medicines for Type 2 Diabetes: Care Instructions  Your Care Instructions    There are different types of noninsulin medicines for diabetes. Each works in a different way. But they all help you control your blood sugar. Some types help your body make insulin to lower your blood sugar. Others lower how much insulin your body needs. Some can slow how fast your body digests sugars. And some can remove extra glucose through your urine. · Alpha-glucosidase inhibitors. These keep starches from breaking down. This means that they lower the amount of glucose absorbed when you eat. They don't help your body make more insulin. So they will not cause low blood sugar unless you use them with other medicines for diabetes. They include acarbose and miglitol. · DPP-4 inhibitors. These help your body raise the level of insulin after you eat. They also help your body make less of a hormone that raises blood sugar. They include linagliptin, saxagliptin, and sitagliptin. · Incretin hormones (GLP-1 receptor agonists). Your body makes a protein that can raise your insulin level. It also can lower your blood sugar and make you less hungry. You can get shots of hormones that work the same way. They include exenatide and liraglutide. · Meglitinides. These help your body release insulin. They also help slow how your body digests sugars. So they can keep your blood sugar from rising too fast after you eat. They include nateglinide and repaglinide. · Metformin. This lowers how much glucose your liver makes. And it helps you respond better to insulin. It also lowers the amount of stored sugar that your liver releases when you are not eating. · SGLT2 inhibitors. These help to remove extra glucose through your urine. They may also help some people lose weight. They include canagliflozin, dapagliflozin, and empagliflozin. · Sulfonylureas. These help your body release more insulin. Some work for many hours.  They can cause low blood sugar if you don't eat as you planned. They include glipizide and glyburide. · Thiazolidinediones. These reduce the amount of blood glucose. They also help you respond better to insulin. They include pioglitazone and rosiglitazone. You may need to take more than one medicine for diabetes. Two or more medicines may work better to lower your blood sugar level than just one does. Follow-up care is a key part of your treatment and safety. Be sure to make and go to all appointments, and call your doctor if you are having problems. It's also a good idea to know your test results and keep a list of the medicines you take. How can you care for yourself at home? · Eat a healthy diet. Get some exercise each day. This may help you to reduce how much medicine you need. · Do not take other prescription or over-the-counter medicines, vitamins, herbal products, or supplements without talking to your doctor first. Some medicines for type 2 diabetes can cause problems with other medicines or supplements. · Tell your doctor if you plan to get pregnant. Some of these drugs are not safe for pregnant women. · Be safe with medicines. Take your medicines exactly as prescribed. Meglitinides and sulfonylureas can cause your blood sugar to drop very low. Call your doctor if you think you are having a problem with your medicine. · Check your blood sugar often. You can use a glucose monitor. Keeping track can help you know how certain foods, activities, and medicines affect your blood sugar. And it can help you keep your blood sugar from getting so low that it's not safe. When should you call for help? Call 911 anytime you think you may need emergency care. For example, call if:  ? · You passed out (lost consciousness). ? · You are confused or cannot think clearly. ? · Your blood sugar is very high or very low. ? Watch closely for changes in your health, and be sure to contact your doctor if:  ? · Your blood sugar stays outside the level your doctor set for you. ? · You have any problems. Where can you learn more? Go to http://anselmo-anne.info/. Enter H153 in the search box to learn more about \"Noninsulin Medicines for Type 2 Diabetes: Care Instructions. \"  Current as of: March 13, 2017  Content Version: 11.4  © 4311-5948 [employer]. Care instructions adapted under license by [employer] (which disclaims liability or warranty for this information). If you have questions about a medical condition or this instruction, always ask your healthcare professional. [address] any warranty or liability for your use of this information. Starting a Weight Loss Plan: Care Instructions  Your Care Instructions    If you are thinking about losing weight, it can be hard to know where to start. Your doctor can help you set up a weight loss plan that best meets your needs. You may want to take a class on nutrition or exercise, or join a weight loss support group. If you have questions about how to make changes to your eating or exercise habits, ask your doctor about seeing a registered dietitian or an exercise specialist.  It can be a big challenge to lose weight. But you do not have to make huge changes at once. Make small changes, and stick with them. When those changes become habit, add a few more changes. If you do not think you are ready to make changes right now, try to pick a date in the future. Make an appointment to see your doctor to discuss whether the time is right for you to start a plan. Follow-up care is a key part of your treatment and safety. Be sure to make and go to all appointments, and call your doctor if you are having problems. It's also a good idea to know your test results and keep a list of the medicines you take. How can you care for yourself at home? · Set realistic goals. Many people expect to lose much more weight than is likely.  A weight loss of 5% to 10% of your body weight may be enough to improve your health. · Get family and friends involved to provide support. Talk to them about why you are trying to lose weight, and ask them to help. They can help by participating in exercise and having meals with you, even if they may be eating something different. · Find what works best for you. If you do not have time or do not like to cook, a program that offers meal replacement bars or shakes may be better for you. Or if you like to prepare meals, finding a plan that includes daily menus and recipes may be best.  · Ask your doctor about other health professionals who can help you achieve your weight loss goals. ¨ A dietitian can help you make healthy changes in your diet. ¨ An exercise specialist or  can help you develop a safe and effective exercise program.  ¨ A counselor or psychiatrist can help you cope with issues such as depression, anxiety, or family problems that can make it hard to focus on weight loss. · Consider joining a support group for people who are trying to lose weight. Your doctor can suggest groups in your area. Where can you learn more? Go to http://anselmoWeMontageanne.info/. Enter Z913 in the search box to learn more about \"Starting a Weight Loss Plan: Care Instructions. \"  Current as of: October 13, 2016  Content Version: 11.4  © 3906-9619 [employer]. Care instructions adapted under license by [employer] (which disclaims liability or warranty for this information). If you have questions about a medical condition or this instruction, always ask your healthcare professional. [place] [address] any warranty or liability for your use of this information.

## 2018-03-07 NOTE — PROGRESS NOTES
Katherine Condon is a 59 y.o. female    Issues discussed today include:    Chief Complaint   Patient presents with    Follow-up     follow up visit    Immunization/Injection       1) HTN:  Has a home BP cuff, checks on occasion. Usually normal ~ 120s/80s. Occasionally SBP up to 130, DBP never > 90. Not taking anything for HTN currently. 2) Diabetes:  Was diagnosed at last visit, was offered medicine but daughter says she declines the medicine and prefers to work on diet, exercise and weight loss. Not checking glucose at home. Does had blurry vision. Saw ophthalmology 2-3 years, was told she had cataract in one eye and also given glasses. Has since lost the glasses and would like to see eye doctor again. Denies family h/o DM. 3) HLD:  Chronic. Has been taking cholesterol medicine for years. Roberto side effects. No h/o MI, CVA. Doesn't smoke tobacco.        Data reviewed or ordered today:       Other problems include:  Patient Active Problem List   Diagnosis Code    Hyperlipidemia E78.5    Essential hypertension I10    Morbid obesity ([address] E66.01    Diabetes mellitus type 2, controlled ([address] E11.9       Medications:  Current Outpatient Prescriptions   Medication Sig Dispense Refill    atorvastatin (LIPITOR) 80 mg tablet Take 0.5 Tabs by mouth daily. 90 Tab 3       Allergies:  No Known Allergies    LMP:  No LMP recorded. Patient is postmenopausal.    Social History     Social History    Marital status: SINGLE     Spouse name: N/A    Number of children: N/A    Years of education: N/A     Occupational History    Not on file. Social History Main Topics    Smoking status: Never Smoker    Smokeless tobacco: Never Used    Alcohol use No    Drug use: No    Sexual activity: Not on file     Other Topics Concern    Not on file     Social History Narrative       No family history on file.       Physical Exam   Visit Vitals    /77 (BP 1 Location: Left arm, BP Patient Position: Sitting)    Pulse 82    Temp 98.2 °F (36.8 °C) (Oral)    Wt 257 lb (116.6 kg)    BMI 43.34 kg/m2      BP Readings from Last 3 Encounters:   03/07/18 119/77   07/26/17 111/67   06/07/17 124/80     Constitutional: Appears well,  No acute distress, Vitals noted  Psychiatric:  Affect normal, Alert and Oriented to person/place/time  Eyes:  Conjunctiva clear, no drainage  ENT:  External ears and nose normal, Mucous membranes moist  Neck:  General inspection normal. Supple. Heart:  Normal HR, Normal S1 and S2,  Regular rhythm. No murmurs, rubs or gallops. Lungs:  Clear to auscultation, good respiratory effort, no wheezes, rales or rhonchi  Extremities: Without edema, good peripheral pulses  Skin:  Warm to palpation, without rashes      Assessment/Plan:      ICD-10-CM ICD-9-CM    1. Diabetes mellitus type 2, diet-controlled ([facility]) E11.9 250.00 AMB POC GLUCOSE BLOOD, BY GLUCOSE MONITORING DEVICE      REFERRAL TO OPHTHALMOLOGY      METABOLIC PANEL, BASIC      HEMOGLOBIN A1C WITH EAG      PNEUMOCOCCAL POLYSACCHARIDE VACCINE, 23-VALENT, ADULT OR IMMUNOSUPPRESSED PT DOSE,   2. Mixed hyperlipidemia E78.2 272.2 atorvastatin (LIPITOR) 80 mg tablet   3. Obesity, Class III, BMI 40-49.9 (morbid obesity) ([facility]) E66.01 278.01    4. Encounter for immunization Z23 V03.89 INFLUENZA VIRUS VAC QUAD,SPLIT,PRESV FREE SYRINGE IM       DMII - last A1c 7/2017 was 6.5%. Not currently on DM medication bc declined. We reviewed the benefits of metformin in reducing risk of DM progression and weight loss. Will check labs today and recommend if still in diabetic range. HLD - last lipid panel 7/2017 ok  Plan to repeat FLP at 1 yr from last    Obesity - counseled to work on weight loss    DOesn't appear pt has HTN any longer, BP well controlled at home and here. No medication needed for now.    Told to monitor BP every 2 weeks and RTC if > 140/90    Flu vaccine given today      Follow-up Disposition:  Return in about 3 months (around 6/7/2018) for DM and weight loss follow up.         Celeste Matos MD  [address] [place] [address] Krystin Cox

## 2018-03-07 NOTE — MR AVS SNAPSHOT
[facility] 
 
 
 [address] [facility] [address] [address] [id number] 
[phone] Patient: August Park MRN: [id number] GYS:[birth year] Visit Information Date & Time Provider Department Dept. Phone Encounter #  
 3/7/2018 11:10 AM Vianey Kearney, [address] [facility] [phone] [id number] Follow-up Instructions Return in about 3 months (around 6/7/2018) for DM and weight loss follow up. Upcoming Health Maintenance Date Due  
 EYE EXAM RETINAL OR DILATED Q1 5/5/1968 Pneumococcal 19-64 Medium Risk (1 of 1 - PPSV23) 5/5/1977 MICROALBUMIN Q1 3/10/2017 FOOT EXAM Q1 4/11/2017 FOBT Q 1 YEAR AGE 50-75 4/11/2017 Influenza Age 5 to Adult 8/1/2017 HEMOGLOBIN A1C Q6M 1/12/2018 ZOSTER VACCINE AGE 60> 3/5/2018 LIPID PANEL Q1 7/12/2018 PAP AKA CERVICAL CYTOLOGY 2/16/2019 BREAST CANCER SCRN MAMMOGRAM 6/7/2019 DTaP/Tdap/Td series (2 - Td) 12/14/2025 Allergies as of 3/7/2018  Review Complete On: 3/7/2018 By: Vianey Kearney MD  
 No Known Allergies Current Immunizations  Reviewed on 12/14/2015 Name Date Influenza Vaccine (Quad) PF 12/14/2015 Tdap 12/14/2015 Not reviewed this visit You Were Diagnosed With   
  
 Codes Comments Diabetes mellitus type 2, diet-controlled [facility]    -  Primary ICD-10-CM: E11.9 ICD-9-CM: 250.00 Mixed hyperlipidemia     ICD-10-CM: E78.2 ICD-9-CM: 272.2 Obesity, Class III, BMI 40-49.9 (morbid obesity) (HCC)     ICD-10-CM: E66.01 
ICD-9-CM: 278.01 Vitals BP Pulse Temp Weight(growth percentile) BMI OB Status 119/77 (BP 1 Location: Left arm, BP Patient Position: Sitting) 82 98.2 °F (36.8 °C) (Oral) 257 lb (116.6 kg) 43.34 kg/m2 Postmenopausal  
 Smoking Status Never Smoker Vitals History BMI and BSA Data Body Mass Index Body Surface Area  
 43.34 kg/m 2 2.3 m 2 Preferred Pharmacy Pharmacy Name Phone [address] [phone] Your Updated Medication List  
  
   
This list is accurate as of 3/7/18 11:48 AM.  Always use your most recent med list.  
  
  
  
  
 atorvastatin 80 mg tablet Commonly known as:  LIPITOR Take 0.5 Tabs by mouth daily. Prescriptions Sent to Pharmacy Refills  
 atorvastatin (LIPITOR) 80 mg tablet 3 Sig: Take 0.5 Tabs by mouth daily. Class: Normal  
 Pharmacy: [facility] Talya Quan [address] [facility] Ph #: [phone] Route: Oral  
  
We Performed the Following AMB POC GLUCOSE BLOOD, BY GLUCOSE MONITORING DEVICE [37600 CPT(R)] REFERRAL TO OPHTHALMOLOGY [REF57 Custom] Comments:  
 Access Now Referral Request Form: 
 
Has the patient live in the area for 6 months Yes Is the patient here on a visa No 
 
Priority: 
 
4 weeks Location: [facility] Patient Demographics: 
 
Date:  3/7/2018                          EMR# [id number] Azalia Waldrop 1960 Home Phone : [phone]             Cell Phone:  same Language :  [place] Specialist Requested:  Ophthalmology Reason for Request:  Diabetes annual exam, h/o cataract Specialist Requirements: 
 
If GYN Referral:   
Pap: n/a If Ortho Referral: MRI n/a      
XRAY: n/a Pulmonary Referrals must have :  
C-X-ray n/a OR  
CT Scan n/a Referring Provider:  Jose L Amanda MD  
  
Follow-up Instructions Return in about 3 months (around 6/7/2018) for DM and weight loss follow up. Referral Information Referral ID Referred By Referred To  
  
 [id number] Taty Palaciosana [facility]ONEL Not Available Visits Status Start Date End Date 1 New Request 3/7/18 3/7/19 If your referral has a status of pending review or denied, additional information will be sent to support the outcome of this decision. Patient Instructions Noninsulin Medicines for Type 2 Diabetes: Care Instructions Your Care Instructions There are different types of noninsulin medicines for diabetes. Each works in a different way. But they all help you control your blood sugar. Some types help your body make insulin to lower your blood sugar. Others lower how much insulin your body needs. Some can slow how fast your body digests sugars. And some can remove extra glucose through your urine. · Alpha-glucosidase inhibitors. These keep starches from breaking down. This means that they lower the amount of glucose absorbed when you eat. They don't help your body make more insulin. So they will not cause low blood sugar unless you use them with other medicines for diabetes. They include acarbose and miglitol. · DPP-4 inhibitors. These help your body raise the level of insulin after you eat. They also help your body make less of a hormone that raises blood sugar. They include linagliptin, saxagliptin, and sitagliptin. · Incretin hormones (GLP-1 receptor agonists). Your body makes a protein that can raise your insulin level. It also can lower your blood sugar and make you less hungry. You can get shots of hormones that work the same way. They include exenatide and liraglutide. · Meglitinides. These help your body release insulin. They also help slow how your body digests sugars. So they can keep your blood sugar from rising too fast after you eat. They include nateglinide and repaglinide. · Metformin. This lowers how much glucose your liver makes. And it helps you respond better to insulin. It also lowers the amount of stored sugar that your liver releases when you are not eating. · SGLT2 inhibitors. These help to remove extra glucose through your urine. They may also help some people lose weight. They include canagliflozin, dapagliflozin, and empagliflozin. · Sulfonylureas. These help your body release more insulin.  Some work for many hours. They can cause low blood sugar if you don't eat as you planned. They include glipizide and glyburide. · Thiazolidinediones. These reduce the amount of blood glucose. They also help you respond better to insulin. They include pioglitazone and rosiglitazone. You may need to take more than one medicine for diabetes. Two or more medicines may work better to lower your blood sugar level than just one does. Follow-up care is a key part of your treatment and safety. Be sure to make and go to all appointments, and call your doctor if you are having problems. It's also a good idea to know your test results and keep a list of the medicines you take. How can you care for yourself at home? · Eat a healthy diet. Get some exercise each day. This may help you to reduce how much medicine you need. · Do not take other prescription or over-the-counter medicines, vitamins, herbal products, or supplements without talking to your doctor first. Some medicines for type 2 diabetes can cause problems with other medicines or supplements. · Tell your doctor if you plan to get pregnant. Some of these drugs are not safe for pregnant women. · Be safe with medicines. Take your medicines exactly as prescribed. Meglitinides and sulfonylureas can cause your blood sugar to drop very low. Call your doctor if you think you are having a problem with your medicine. · Check your blood sugar often. You can use a glucose monitor. Keeping track can help you know how certain foods, activities, and medicines affect your blood sugar. And it can help you keep your blood sugar from getting so low that it's not safe. When should you call for help? Call 911 anytime you think you may need emergency care. For example, call if: 
? · You passed out (lost consciousness). ? · You are confused or cannot think clearly. ? · Your blood sugar is very high or very low. ? Watch closely for changes in your health, and be sure to contact your doctor if: 
? · Your blood sugar stays outside the level your doctor set for you. ? · You have any problems. Where can you learn more? Go to http://anselmo-anne.info/. Enter H153 in the search box to learn more about \"Noninsulin Medicines for Type 2 Diabetes: Care Instructions. \" Current as of: March 13, 2017 Content Version: 11.4 © 7456-3623 [employer]. Care instructions adapted under license by [employer] (which disclaims liability or warranty for this information). If you have questions about a medical condition or this instruction, always ask your healthcare professional. [address] any warranty or liability for your use of this information. Starting a Weight Loss Plan: Care Instructions Your Care Instructions If you are thinking about losing weight, it can be hard to know where to start. Your doctor can help you set up a weight loss plan that best meets your needs. You may want to take a class on nutrition or exercise, or join a weight loss support group. If you have questions about how to make changes to your eating or exercise habits, ask your doctor about seeing a registered dietitian or an exercise specialist. 
It can be a big challenge to lose weight. But you do not have to make huge changes at once. Make small changes, and stick with them. When those changes become habit, add a few more changes. If you do not think you are ready to make changes right now, try to pick a date in the future. Make an appointment to see your doctor to discuss whether the time is right for you to start a plan. Follow-up care is a key part of your treatment and safety. Be sure to make and go to all appointments, and call your doctor if you are having problems. It's also a good idea to know your test results and keep a list of the medicines you take. How can you care for yourself at home? · Set realistic goals. Many people expect to lose much more weight than is likely. A weight loss of 5% to 10% of your body weight may be enough to improve your health. · Get family and friends involved to provide support. Talk to them about why you are trying to lose weight, and ask them to help. They can help by participating in exercise and having meals with you, even if they may be eating something different. · Find what works best for you. If you do not have time or do not like to cook, a program that offers meal replacement bars or shakes may be better for you. Or if you like to prepare meals, finding a plan that includes daily menus and recipes may be best. 
· Ask your doctor about other health professionals who can help you achieve your weight loss goals. ¨ A dietitian can help you make healthy changes in your diet. ¨ An exercise specialist or  can help you develop a safe and effective exercise program. 
¨ A counselor or psychiatrist can help you cope with issues such as depression, anxiety, or family problems that can make it hard to focus on weight loss. · Consider joining a support group for people who are trying to lose weight. Your doctor can suggest groups in your area. Where can you learn more? Go to http://anselmoadaffixanne.info/. Enter K216 in the search box to learn more about \"Starting a Weight Loss Plan: Care Instructions. \" Current as of: October 13, 2016 Content Version: 11.4 © 0319-9710 [employer]. Care instructions adapted under license by [employer] (which disclaims liability or warranty for this information). If you have questions about a medical condition or this instruction, always ask your healthcare professional. [address] any warranty or liability for your use of this information. Introducing [facility]!    
 Yadira Zee introduces [employer] patient portal. Now you can access parts of your medical record, email your doctor's office, and request medication refills online. 1. In your internet browser, go to [email or web address] 2. Click on the First Time User? Click Here link in the Sign In box. You will see the New Member Sign Up page. 3. Enter your [employer] Access Code exactly as it appears below. You will not need to use this code after youve completed the sign-up process. If you do not sign up before the expiration date, you must request a new code. · [employer] Access Code: [email or web address] Expires: 6/5/2018 11:33 AM 
 
4. Enter the last four digits of your Social Security Number (xxxx) and Date of Birth (mm/dd/yyyy) as indicated and click Submit. You will be taken to the next sign-up page. 5. Create a [employer] ID. This will be your [employer] login ID and cannot be changed, so think of one that is secure and easy to remember. 6. Create a [employer] password. You can change your password at any time. 7. Enter your Password Reset Question and Answer. This can be used at a later time if you forget your password. 8. Enter your e-mail address. You will receive e-mail notification when new information is available in [address] 9. Click Sign Up. You can now view and download portions of your medical record. 10. Click the Download Summary menu link to download a portable copy of your medical information. If you have questions, please visit the Frequently Asked Questions section of the [employer] website. Remember, [employer] is NOT to be used for urgent needs. For medical emergencies, dial 911. Now available from your iPhone and Android! Please provide this summary of care documentation to your next provider. Your primary care clinician is listed as Oneta Burkitt. Tedi Becket. If you have any questions after today's visit, please call [phone].

## 2018-03-07 NOTE — PROGRESS NOTES
Coordination of Care  1. Have you been to the ER, urgent care clinic since your last visit? Hospitalized since your last visit? No    2. Have you seen or consulted any other health care providers outside of the [address] [place] [address] since your last visit? Include any pap smears or colon screening. No    Does the patient need refills?  N/A    Learning Assessment Complete? yes  Results for orders placed or performed in visit on 03/07/18   AMB POC GLUCOSE BLOOD, BY GLUCOSE MONITORING DEVICE   Result Value Ref Range    Glucose  fasting mg/dL

## 2018-03-07 NOTE — PROGRESS NOTES
Please see scanned vaccine consent form for vaccines administered today. The patient declined the need for an .  Nicole Fernandes RN

## 2018-03-08 LAB
EST. AVERAGE GLUCOSE BLD GHB EST-MCNC: 134 MG/DL
HBA1C MFR BLD: 6.3 % (ref 4.2–6.3)

## 2018-03-09 NOTE — PROGRESS NOTES
A1c stable, now in pre-DM range.  Pt to continue lifestyle modifications per her preference  BMP wnl, no kidney, sugar or electrolyte problems identified

## 2018-07-05 DIAGNOSIS — I10 ESSENTIAL HYPERTENSION: ICD-10-CM

## 2018-07-05 RX ORDER — AMLODIPINE BESYLATE 5 MG/1
TABLET ORAL
Qty: 30 TAB | Refills: 2
Start: 2018-07-05

## 2018-07-11 ENCOUNTER — OFFICE VISIT (OUTPATIENT)
Dept: FAMILY PLANNING/WOMEN'S HEALTH CLINIC | Age: 58
End: 2018-07-11

## 2018-07-11 ENCOUNTER — HOSPITAL ENCOUNTER (OUTPATIENT)
Dept: MAMMOGRAPHY | Age: 58
Discharge: HOME | End: 2018-07-11
Attending: NURSE PRACTITIONER

## 2018-07-11 VITALS — DIASTOLIC BLOOD PRESSURE: 75 MMHG | SYSTOLIC BLOOD PRESSURE: 124 MMHG

## 2018-07-11 DIAGNOSIS — Z12.31 VISIT FOR SCREENING MAMMOGRAM: ICD-10-CM

## 2018-07-11 DIAGNOSIS — Z01.419 WELL WOMAN EXAM: Primary | ICD-10-CM

## 2018-07-11 PROCEDURE — 77067 SCR MAMMO BI INCL CAD: CPT

## 2018-07-11 NOTE — PROGRESS NOTES
EVERY WOMANS LIFE HISTORY QUESTIONNAIRE       No Yes Comments   Has a doctor ever seen or felt anything wrong with your breast? [x]                                  []                                     Have you ever had a breast biopsy? [x]                                  []                                          When and where was last mammogram performed? 6/2017 with [facility]    Have you ever been told that there was a problem on your mammogram?   No Yes Comments   [x]                                  []                                       Do you have breast implants? No Yes Comments   [x]                                  []                                       When was your last Pap test performed? 2/2016 at [facility] you ever had an abnormal Pap test?   No Yes Comments   [x]                                  []                                       Have you had a hysterectomy? No Yes Comments (why)   [x]                                  []                                       Have you ever been diagnosed with any type of Cancer   No Yes Comments (type,when,where,type of treatment   [x]                                  []                                          Has a family member been diagnosed with breast or ovarian cancer? No Yes Comments (which family members, and type   [x]                                  []                                       Did your mother take JUVENAL? No Yes Unknown   []                                  []                                  X     Do you have a history of HIV exposure? No Yes    [x]                                  []                                         Have you been through menopause?    No Yes Date of LMP   []                                  [x]                                  2014     Are you taking hormone replacement therapy (HRT)     No Yes Comments   [x]                                  []                                       How many times have you been pregnant? 5      Number of live births ? 5    Are you experiencing any of the following? No Yes Comments   Nipple Discharge [x]                                  []                                     Breast Lump/Masses [x]                                  []                                     Breast Skin Changes [x]                                  []                                          No Yes Comments   Vaginal Discharge [x]                                  []                                     Abnormal/unusual vaginal bleeding [x]                                  []                                         Are you experiencing any other health problems?      High chol----followed at [facility]

## 2018-07-11 NOTE — PROGRESS NOTES
HISTORY OF PRESENT ILLNESS  Pavan Shankar is a 59 y.o. female. HPI   55yoF,  here for [facility]. [facility] denies abnormal SBE's, performs intermittently. She denies abnormal vaginal discharge and bloating; no pelvic pain. No colonoscopy to date. Post-menopause, LMP 2014. Review of Systems   Constitutional: Negative for chills, diaphoresis, malaise/fatigue and weight loss. Gastrointestinal: Negative for abdominal pain, blood in stool, constipation and diarrhea. Physical Exam   Constitutional: She is oriented to person, place, and time. She appears well-developed and well-nourished. Pulmonary/Chest: Right breast exhibits no inverted nipple, no mass, no nipple discharge, no skin change and no tenderness. Left breast exhibits no inverted nipple, no mass, no nipple discharge, no skin change and no tenderness. Breasts are symmetrical.   Genitourinary: Rectum normal, vagina normal and uterus normal. Rectal exam shows no mass, no tenderness and guaiac negative stool. Pelvic exam was performed with patient supine. There is no rash, tenderness or lesion on the right labia. There is no rash, tenderness or lesion on the left labia. Cervix exhibits no motion tenderness, no discharge and no friability. Right adnexum displays no mass, no tenderness and no fullness. Left adnexum displays no mass, no tenderness and no fullness. No erythema, tenderness or bleeding in the vagina. No vaginal discharge found. Genitourinary Comments: Bimanual pelvic exam limited d/t body habitus. Lymphadenopathy:     She has no cervical adenopathy. She has no axillary adenopathy. Right: No inguinal and no supraclavicular adenopathy present. Left: No inguinal and no supraclavicular adenopathy present. Neurological: She is alert and oriented to person, place, and time. Skin: Skin is warm and dry. Psychiatric: She has a normal mood and affect.  Her behavior is normal. Thought content normal.   Nursing note and vitals reviewed. ASSESSMENT and PLAN  1. [facility] exam  2. CBE benign  3. Screening mammogram today  4. Post-menopause, LMP 2014  5.  Colonoscopy GL's discussed      -seek [facility]'s program, phone number provided

## 2018-07-17 ENCOUNTER — OFFICE VISIT (OUTPATIENT)
Dept: FAMILY MEDICINE CLINIC | Age: 58
End: 2018-07-17

## 2018-07-17 VITALS
WEIGHT: 240 LBS | HEART RATE: 63 BPM | SYSTOLIC BLOOD PRESSURE: 131 MMHG | BODY MASS INDEX: 40.48 KG/M2 | TEMPERATURE: 98.3 F | DIASTOLIC BLOOD PRESSURE: 71 MMHG

## 2018-07-17 DIAGNOSIS — N30.01 ACUTE CYSTITIS WITH HEMATURIA: ICD-10-CM

## 2018-07-17 DIAGNOSIS — R30.9 URINARY PAIN: Primary | ICD-10-CM

## 2018-07-17 LAB
BILIRUB UR QL STRIP: NEGATIVE
GLUCOSE UR-MCNC: NEGATIVE MG/DL
KETONES P FAST UR STRIP-MCNC: NEGATIVE MG/DL
PH UR STRIP: 6 [PH] (ref 4.6–8)
PROT UR QL STRIP: NEGATIVE
SP GR UR STRIP: 1.03 (ref 1–1.03)
UA UROBILINOGEN AMB POC: NORMAL (ref 0.2–1)
URINALYSIS CLARITY POC: NORMAL
URINALYSIS COLOR POC: YELLOW
URINE BLOOD POC: NORMAL
URINE LEUKOCYTES POC: NORMAL
URINE NITRITES POC: NEGATIVE

## 2018-07-17 RX ORDER — NITROFURANTOIN 25; 75 MG/1; MG/1
100 CAPSULE ORAL 2 TIMES DAILY
Qty: 14 CAP | Refills: 0 | Status: SHIPPED | OUTPATIENT
Start: 2018-07-17 | End: 2019-08-21

## 2018-07-17 NOTE — PROGRESS NOTES
Coordination of Care  1. Have you been to the ER, urgent care clinic since your last visit? Hospitalized since your last visit? No    2. Have you seen or consulted any other health care providers outside of the [address] [place] [address] since your last visit? Include any pap smears or colon screening. No    Does the patient need refills?  YES    Learning Assessment Complete? yes      Results for orders placed or performed in visit on 07/17/18   AMB POC URINALYSIS DIP STICK MANUAL W/O MICRO   Result Value Ref Range    Color (UA POC) Yellow     Clarity (UA POC) Slightly Cloudy     Glucose (UA POC) Negative Negative    Bilirubin (UA POC) Negative Negative    Ketones (UA POC) Negative Negative    Specific gravity (UA POC) 1.030 1.001 - 1.035    Blood (UA POC) Trace Negative    pH (UA POC) 6 4.6 - 8.0    Protein (UA POC) Negative Negative    Urobilinogen (UA POC) 0.2 mg/dL 0.2 - 1    Nitrites (UA POC) Negative Negative    Leukocyte esterase (UA POC) 3+ Negative

## 2018-07-17 NOTE — PATIENT INSTRUCTIONS
Urinary Tract Infection in Women: Care Instructions  Your Care Instructions    A urinary tract infection, or UTI, is a general term for an infection anywhere between the kidneys and the urethra (where urine comes out). Most UTIs are bladder infections. They often cause pain or burning when you urinate. UTIs are caused by bacteria and can be cured with antibiotics. Be sure to complete your treatment so that the infection goes away. Follow-up care is a key part of your treatment and safety. Be sure to make and go to all appointments, and call your doctor if you are having problems. It's also a good idea to know your test results and keep a list of the medicines you take. How can you care for yourself at home? · Take your antibiotics as directed. Do not stop taking them just because you feel better. You need to take the full course of antibiotics. · Drink extra water and other fluids for the next day or two. This may help wash out the bacteria that are causing the infection. (If you have kidney, heart, or liver disease and have to limit fluids, talk with your doctor before you increase your fluid intake.)  · Avoid drinks that are carbonated or have caffeine. They can irritate the bladder. · Urinate often. Try to empty your bladder each time. · To relieve pain, take a hot bath or lay a heating pad set on low over your lower belly or genital area. Never go to sleep with a heating pad in place. To prevent UTIs  · Drink plenty of water each day. This helps you urinate often, which clears bacteria from your system. (If you have kidney, heart, or liver disease and have to limit fluids, talk with your doctor before you increase your fluid intake.)  · Urinate when you need to. · Urinate right after you have sex. · Change sanitary pads often. · Avoid douches, bubble baths, feminine hygiene sprays, and other feminine hygiene products that have deodorants.   · After going to the bathroom, wipe from front to back.  When should you call for help? Call your doctor now or seek immediate medical care if:    · Symptoms such as fever, chills, nausea, or vomiting get worse or appear for the first time.     · You have new pain in your back just below your rib cage. This is called flank pain.     · There is new blood or pus in your urine.     · You have any problems with your antibiotic medicine.    Watch closely for changes in your health, and be sure to contact your doctor if:    · You are not getting better after taking an antibiotic for 2 days.     · Your symptoms go away but then come back. Where can you learn more? Go to http://anselmo-anne.info/. Enter H304 in the search box to learn more about \"Urinary Tract Infection in Women: Care Instructions. \"  Current as of: May 12, 2017  Content Version: 11.7  © 0840-5737 [employer]. Care instructions adapted under license by [employer] (which disclaims liability or warranty for this information). If you have questions about a medical condition or this instruction, always ask your healthcare professional. [address] any warranty or liability for your use of this information.

## 2018-07-17 NOTE — MR AVS SNAPSHOT
[address] [id number] [phone] Patient: Magi Barron MRN: [id number] AGQ:[birth year] Visit Information Date & Time Provider Department Dept. Phone Encounter #  
 7/17/2018  3:15 PM Franko Andrew [address], [address] [facility] [phone] [id number] Follow-up Instructions Return in about 3 months (around 10/17/2018), or if symptoms worsen or fail to improve. Upcoming Health Maintenance Date Due  
 EYE EXAM RETINAL OR DILATED Q1 5/5/1968 MICROALBUMIN Q1 3/10/2017 FOOT EXAM Q1 4/11/2017 FOBT Q 1 YEAR AGE 50-75 4/11/2017 ZOSTER VACCINE AGE 60> 3/5/2018 LIPID PANEL Q1 7/12/2018 Influenza Age 5 to Adult 8/1/2018 HEMOGLOBIN A1C Q6M 9/7/2018 PAP AKA CERVICAL CYTOLOGY 2/16/2019 BREAST CANCER SCRN MAMMOGRAM 7/11/2020 DTaP/Tdap/Td series (2 - Td) 12/14/2025 Allergies as of 7/17/2018  Review Complete On: 7/17/2018 By: Radha Garcia No Known Allergies Current Immunizations  Reviewed on 12/14/2015 Name Date Influenza Vaccine (Quad) PF 3/7/2018, 12/14/2015 Pneumococcal Polysaccharide (PPSV-23) 3/7/2018 Tdap 12/14/2015 Not reviewed this visit You Were Diagnosed With   
  
 Codes Comments Urinary pain    -  Primary ICD-10-CM: R30.9 ICD-9-CM: 448. 1 Acute cystitis with hematuria     ICD-10-CM: N30.01 
ICD-9-CM: 595.0 Vitals BP Pulse Temp Weight(growth percentile) BMI OB Status 131/71 (BP 1 Location: Left arm, BP Patient Position: Sitting) 63 98.3 °F (36.8 °C) (Oral) 240 lb (108.9 kg) 40.48 kg/m2 Postmenopausal  
 Smoking Status Never Smoker Vitals History BMI and BSA Data Body Mass Index Body Surface Area 40.48 kg/m 2 2.23 m 2 Preferred Pharmacy Pharmacy Name Phone [facility] [phone] Your Updated Medication List  
  
   
 This list is accurate as of 7/17/18  3:35 PM.  Always use your most recent med list.  
  
  
  
  
 atorvastatin 80 mg tablet Commonly known as:  LIPITOR Take 0.5 Tabs by mouth daily. nitrofurantoin (macrocrystal-monohydrate) 100 mg capsule Commonly known as:  MACROBID Take 1 Cap by mouth two (2) times a day. Prescriptions Sent to Pharmacy Refills  
 nitrofurantoin, macrocrystal-monohydrate, (MACROBID) 100 mg capsule 0 Sig: Take 1 Cap by mouth two (2) times a day. Class: Normal  
 Pharmacy: [address] [address] [id number], [facility] [address] [facility] Ph #: [phone] Route: Oral  
  
We Performed the Following AMB POC URINALYSIS DIP STICK MANUAL W/O MICRO [52933 CPT(R)] Follow-up Instructions Return in about 3 months (around 10/17/2018), or if symptoms worsen or fail to improve. Patient Instructions Urinary Tract Infection in Women: Care Instructions Your Care Instructions A urinary tract infection, or UTI, is a general term for an infection anywhere between the kidneys and the urethra (where urine comes out). Most UTIs are bladder infections. They often cause pain or burning when you urinate. UTIs are caused by bacteria and can be cured with antibiotics. Be sure to complete your treatment so that the infection goes away. Follow-up care is a key part of your treatment and safety. Be sure to make and go to all appointments, and call your doctor if you are having problems. It's also a good idea to know your test results and keep a list of the medicines you take. How can you care for yourself at home? · Take your antibiotics as directed. Do not stop taking them just because you feel better. You need to take the full course of antibiotics. · Drink extra water and other fluids for the next day or two. This may help wash out the bacteria that are causing the infection.  (If you have kidney, heart, or liver disease and have to limit fluids, talk with your doctor before you increase your fluid intake.) · Avoid drinks that are carbonated or have caffeine. They can irritate the bladder. · Urinate often. Try to empty your bladder each time. · To relieve pain, take a hot bath or lay a heating pad set on low over your lower belly or genital area. Never go to sleep with a heating pad in place. To prevent UTIs · Drink plenty of water each day. This helps you urinate often, which clears bacteria from your system. (If you have kidney, heart, or liver disease and have to limit fluids, talk with your doctor before you increase your fluid intake.) · Urinate when you need to. · Urinate right after you have sex. · Change sanitary pads often. · Avoid douches, bubble baths, feminine hygiene sprays, and other feminine hygiene products that have deodorants. · After going to the bathroom, wipe from front to back. When should you call for help? Call your doctor now or seek immediate medical care if: 
  · Symptoms such as fever, chills, nausea, or vomiting get worse or appear for the first time.  
  · You have new pain in your back just below your rib cage. This is called flank pain.  
  · There is new blood or pus in your urine.  
  · You have any problems with your antibiotic medicine.  
 Watch closely for changes in your health, and be sure to contact your doctor if: 
  · You are not getting better after taking an antibiotic for 2 days.  
  · Your symptoms go away but then come back. Where can you learn more? Go to http://anselmo-anne.info/. Enter F588 in the search box to learn more about \"Urinary Tract Infection in Women: Care Instructions. \" Current as of: May 12, 2017 Content Version: 11.7 © 7133-1052 [employer].  Care instructions adapted under license by [employer] (which disclaims liability or warranty for this information). If you have questions about a medical condition or this instruction, always ask your healthcare professional. [address] any warranty or liability for your use of this information. Introducing [facility] SERVICES! [employer] introduces Bloompop patient portal. Now you can access parts of your medical record, email your doctor's office, and request medication refills online. 1. In your internet browser, go to [email or web address] 2. Click on the First Time User? Click Here link in the Sign In box. You will see the New Member Sign Up page. 3. Enter your Bloompop Access Code exactly as it appears below. You will not need to use this code after youve completed the sign-up process. If you do not sign up before the expiration date, you must request a new code. · Bloompop Access Code: [email or web address] Expires: 10/7/2018 10:11 AM 
 
4. Enter the last four digits of your Social Security Number (xxxx) and Date of Birth (mm/dd/yyyy) as indicated and click Submit. You will be taken to the next sign-up page. 5. Create a Bloompop ID. This will be your Bloompop login ID and cannot be changed, so think of one that is secure and easy to remember. 6. Create a Bloompop password. You can change your password at any time. 7. Enter your Password Reset Question and Answer. This can be used at a later time if you forget your password. 8. Enter your e-mail address. You will receive e-mail notification when new information is available in [address] 9. Click Sign Up. You can now view and download portions of your medical record. 10. Click the Download Summary menu link to download a portable copy of your medical information. If you have questions, please visit the Frequently Asked Questions section of the Bloompop website. Remember, Bloompop is NOT to be used for urgent needs. For medical emergencies, dial 911. Now available from your iPhone and Android! Please provide this summary of care documentation to your next provider. Your primary care clinician is listed as Sriram Londono. Jaqueline Delarosa. If you have any questions after today's visit, please call [phone].

## 2018-07-17 NOTE — PROGRESS NOTES
Assessment/Plan:    Diagnoses and all orders for this visit:    1. Urinary pain  -     AMB POC URINALYSIS DIP STICK MANUAL W/O MICRO    2. Acute cystitis with hematuria  -     nitrofurantoin, macrocrystal-monohydrate, (MACROBID) 100 mg capsule; Take 1 Cap by mouth two (2) times a day. Follow-up Disposition:  Return in about 3 months (around 10/17/2018), or if symptoms worsen or fail to improve. JAYLENE Howe expressed understanding of this plan. An AVS was printed and given to the patient.      ----------------------------------------------------------------------    Chief Complaint   Patient presents with    Elevated Blood Pressure     Elevated blood pressure    Urinary Pain     Urinary pain on and off for the las 2 weeks        History of Present Illness:    Pt here for 2 weeks of painful urination, no vaginal itching, no fever, no back pain. She has been trying to drink \"plenty of water\"  She has been checking her bp and in the past week, since her urinary sxs started, she has had a few readings with systolic in the 726 and once in the 150. She had accompanying headache then but this has resolved    Past Medical History:   Diagnosis Date    Essential hypertension     Hyperlipidemia     Morbid obesity ([facility] [address]     Type 2 diabetes mellitus ([facility] [address] dx 1/2016    with a1c 6.7%       Current Outpatient Prescriptions   Medication Sig Dispense Refill    nitrofurantoin, macrocrystal-monohydrate, (MACROBID) 100 mg capsule Take 1 Cap by mouth two (2) times a day. 14 Cap 0    atorvastatin (LIPITOR) 80 mg tablet Take 0.5 Tabs by mouth daily. 90 Tab 3       No Known Allergies    Social History   Substance Use Topics    Smoking status: Never Smoker    Smokeless tobacco: Never Used    Alcohol use No       No family history on file.     Physical Exam:     Visit Vitals    /71 (BP 1 Location: Left arm, BP Patient Position: Sitting)    Pulse 63    Temp 98.3 °F (36.8 °C) (Oral)    Wt 240 lb (108.9 kg)    BMI 40.48 kg/m2     Looks well, bp normal today  A&Ox3  WDWN NAD  Respirations normal and non labored  No suprapubic tenderness on exam  Results for orders placed or performed in visit on 07/17/18   AMB POC URINALYSIS DIP STICK MANUAL W/O MICRO     Status: None   Result Value Ref Range Status    Color (UA POC) Yellow  Final    Clarity (UA POC) Slightly Cloudy  Final    Glucose (UA POC) Negative Negative Final    Bilirubin (UA POC) Negative Negative Final    Ketones (UA POC) Negative Negative Final    Specific gravity (UA POC) 1.030 1.001 - 1.035 Final    Blood (UA POC) Trace Negative Final    pH (UA POC) 6 4.6 - 8.0 Final    Protein (UA POC) Negative Negative Final    Urobilinogen (UA POC) 0.2 mg/dL 0.2 - 1 Final    Nitrites (UA POC) Negative Negative Final    Leukocyte esterase (UA POC) 3+ Negative Final

## 2018-08-09 ENCOUNTER — OFFICE VISIT (OUTPATIENT)
Dept: FAMILY MEDICINE CLINIC | Age: 58
End: 2018-08-09

## 2018-08-09 ENCOUNTER — HOSPITAL ENCOUNTER (OUTPATIENT)
Dept: LAB | Age: 58
Discharge: HOME | End: 2018-08-09

## 2018-08-09 VITALS
WEIGHT: 248 LBS | DIASTOLIC BLOOD PRESSURE: 74 MMHG | BODY MASS INDEX: 41.32 KG/M2 | HEIGHT: 65 IN | HEART RATE: 74 BPM | TEMPERATURE: 98.5 F | SYSTOLIC BLOOD PRESSURE: 127 MMHG

## 2018-08-09 DIAGNOSIS — E11.9 CONTROLLED TYPE 2 DIABETES MELLITUS WITHOUT COMPLICATION, WITHOUT LONG-TERM CURRENT USE OF INSULIN (HCC): ICD-10-CM

## 2018-08-09 DIAGNOSIS — M25.551 TENDERNESS OF HIP, RIGHT: ICD-10-CM

## 2018-08-09 DIAGNOSIS — I10 ESSENTIAL HYPERTENSION: ICD-10-CM

## 2018-08-09 DIAGNOSIS — M79.604 RIGHT LEG PAIN: Primary | ICD-10-CM

## 2018-08-09 PROBLEM — H26.9 CATARACT: Status: ACTIVE | Noted: 2018-08-09

## 2018-08-09 LAB
ANION GAP SERPL CALC-SCNC: 5 MMOL/L (ref 5–15)
BUN SERPL-MCNC: 11 MG/DL (ref 6–20)
BUN/CREAT SERPL: 13 (ref 12–20)
CALCIUM SERPL-MCNC: 8.6 MG/DL (ref 8.5–10.1)
CHLORIDE SERPL-SCNC: 106 MMOL/L (ref 97–108)
CHOLEST SERPL-MCNC: 283 MG/DL
CO2 SERPL-SCNC: 29 MMOL/L (ref 21–32)
CREAT SERPL-MCNC: 0.83 MG/DL (ref 0.55–1.02)
EST. AVERAGE GLUCOSE BLD GHB EST-MCNC: 137 MG/DL
GLUCOSE SERPL-MCNC: 92 MG/DL (ref 65–100)
HBA1C MFR BLD: 6.4 % (ref 4.2–6.3)
HDLC SERPL-MCNC: 66 MG/DL
HDLC SERPL: 4.3 {RATIO} (ref 0–5)
LDLC SERPL CALC-MCNC: 192.6 MG/DL (ref 0–100)
LIPID PROFILE,FLP: ABNORMAL
POTASSIUM SERPL-SCNC: 4.4 MMOL/L (ref 3.5–5.1)
SODIUM SERPL-SCNC: 140 MMOL/L (ref 136–145)
TRIGL SERPL-MCNC: 122 MG/DL (ref ?–150)
VLDLC SERPL CALC-MCNC: 24.4 MG/DL

## 2018-08-09 PROCEDURE — 83036 HEMOGLOBIN GLYCOSYLATED A1C: CPT | Performed by: FAMILY MEDICINE

## 2018-08-09 PROCEDURE — 80048 BASIC METABOLIC PNL TOTAL CA: CPT | Performed by: FAMILY MEDICINE

## 2018-08-09 PROCEDURE — 80061 LIPID PANEL: CPT | Performed by: FAMILY MEDICINE

## 2018-08-09 RX ORDER — NAPROXEN 500 MG/1
500 TABLET ORAL
Qty: 60 TAB | Refills: 0 | Status: SHIPPED | OUTPATIENT
Start: 2018-08-09 | End: 2019-08-21

## 2018-08-09 NOTE — PROGRESS NOTES
Ricihe Mendieta is a 59 y.o. female    Issues discussed today include:    1) R leg pain:  Started 1 month ago out of the blue, no h/o fall or injury to leg or back. Located in R lateral upper leg. Also with low back pain that started about the same time, but this is mild compared to leg pain. At it's worst, pain is 10/10 and now rates it 6/10. Pain worsens with standing, walking, bending. Better with lying down. Though has been disrupting her sleep, can't get comfortable. Has tried OTC \"magnilife leg and back pain\" tablets from [employer] with a little relief. No other tx's tried. Also with numbness in both feet, rare and not correlated with leg pain. Denies leg weakness or incontinence.  + h/o DM. No h/o CKD. Pt denies h/o PUD, GI bleed, CAD. Data reviewed or ordered today:       Other problems include:  Patient Active Problem List   Diagnosis Code    Hyperlipidemia E78.5    Essential hypertension I10    Morbid obesity ([facility] [address] E66.01    Diabetes mellitus type 2, controlled ([facility] [address] E11.9       Medications:  Current Outpatient Prescriptions   Medication Sig Dispense Refill    naproxen (NAPROSYN) 500 mg tablet Take 1 Tab by mouth two (2) times daily as needed. 60 Tab 0    nitrofurantoin, macrocrystal-monohydrate, (MACROBID) 100 mg capsule Take 1 Cap by mouth two (2) times a day. 14 Cap 0    atorvastatin (LIPITOR) 80 mg tablet Take 0.5 Tabs by mouth daily. 90 Tab 3       Allergies:  No Known Allergies    LMP:  No LMP recorded. Patient is postmenopausal.    Social History     Social History    Marital status: SINGLE     Spouse name: N/A    Number of children: N/A    Years of education: N/A     Occupational History    Not on file. Social History Main Topics    Smoking status: Never Smoker    Smokeless tobacco: Never Used    Alcohol use No    Drug use: No    Sexual activity: Not on file     Other Topics Concern    Not on file     Social History Narrative       No family history on file.       Physical Exam Visit Vitals    /74 (BP 1 Location: Left arm)    Pulse 74    Temp 98.5 °F (36.9 °C) (Oral)    Ht 5' 4.57\" (1.64 m)    Wt 248 lb (112.5 kg)    BMI 41.82 kg/m2      BP Readings from Last 3 Encounters:   08/09/18 127/74   07/17/18 131/71   07/11/18 124/75     Constitutional: Appears well,  No acute distress, Vitals noted  Psychiatric:  Affect normal, Alert and Oriented to person/place/time  Eyes:  Conjunctiva clear, no drainage  ENT:  External ears and nose normal, Mucous membranes moist  Neck:  General inspection normal. Supple. Heart:  Normal HR, Normal S1 and S2,  Regular rhythm. No murmurs, rubs or gallops.   Lungs:  Clear to auscultation, good respiratory effort, no wheezes, rales or rhonchi  MSK: no obvious deformity, no joint swelling, bilateral knees with 0-130 degrees ROM, hips with FROM, R lateral hip pain with int rotation only, + tenderness over R greater trochanteric bursa, 5/5 strength in BLE, neg SLR bilaterally, + antalgic gait, no assistive device   Extremities: Without edema, good peripheral pulses  Skin:  Warm to palpation, without rashes      [facility]  OFFICE PROCEDURE PROGRESS NOTE        Chart reviewed for the following:   INeida MD, have reviewed the History, Physical and updated the Allergic reactions for Mendez BOWSER OUT performed immediately prior to start of procedure:   Alesia Gonsalez MD, have performed the following reviews on Mendez Mendes prior to the start of the procedure:            * Patient was identified by name and date of birth   * Agreement on procedure being performed was verified  * Risks and Benefits explained to the patient  * Procedure site verified and marked as necessary  * Patient was positioned for comfort  * Consent was signed and verified     Time: 9:51AM  Date of procedure: 8/9/2018  Procedure performed by:  Neida Tilley MD  Provider assisted by: None   Patient assisted by: daughter  How tolerated by patient: tolerated the procedure well with no complications  Post Procedural Pain Scale: 0 - No Hurt  Comments: none      PROCEDURE NOTE:     Informed consent obtained verbally and risks, benefits and alternatives discussed. Time out performed, cross checking patient ID and procedure. The Right hip was cleaned and prepped with sterile technique using alcohol, anesthetized with ethyl chloride spray and the trochanteric bursa was injected with Kenalog 40 mg (1cc) and 2 ml of 1% lidocaine. The patient tolerated the procedure well and there were no complications. Assessment/Plan:      ICD-10-CM ICD-9-CM    1. Right leg pain M79.604 729.5    2. Tenderness of hip, right M25.551 719.45    3. Essential hypertension Z24 018.5 METABOLIC PANEL, BASIC      LIPID PANEL   4. Controlled type 2 diabetes mellitus without complication, without long-term current use of insulin ([facility]) D48.2 602.07 METABOLIC PANEL, BASIC      HEMOGLOBIN A1C WITH EAG       Exam c/w R greater throcanteric bursitis. No groin or buttock pain with hip ROM, only lateral pain with stretch of int rotation. No LBP red flags.  - R hip bursa steroid injection done today, pt tolerated well  - Rx for naproxen 500mg bid prn  - AVS with hip exercises/stretches  - Consider XR hip if no improvement    H/o NIDDMII, prev very well controlled. Due for labs. - A1c, BMP, lipids        Follow-up Disposition:  Return 2-3 months for leg pain follow up if symptoms worsen or fail to improve, for Diabetes follow up in 3 months.         Pheobe Canavan, MD  [address] [place] [address], Levi Pimentel

## 2018-08-09 NOTE — PROGRESS NOTES
Coordination of Care  1. Have you been to the ER, urgent care clinic since your last visit? Hospitalized since your last visit? No    2. Have you seen or consulted any other health care providers outside of the [address] [place] [address] since your last visit? Include any pap smears or colon screening. No    Does the patient need refills? NO    Learning Assessment Complete?  yes

## 2018-08-09 NOTE — MR AVS SNAPSHOT
[address] [address] 
[phone] Patient: Tara Stephenson MRN: [id number] NYV:[birth year] Visit Information Date & Time Provider Department Dept. Phone Encounter #  
 8/9/2018  8:30 AM Ghanshyam De Luna, [address] [facility] [phone] [id number] Follow-up Instructions Return 2-3 months for leg pain follow up if symptoms worsen or fail to improve, for Diabetes follow up in 3 months. Upcoming Health Maintenance Date Due MICROALBUMIN Q1 3/10/2017 FOOT EXAM Q1 4/11/2017 FOBT Q 1 YEAR AGE 50-75 4/11/2017 ZOSTER VACCINE AGE 60> 3/5/2018 LIPID PANEL Q1 7/12/2018 Influenza Age 5 to Adult 8/1/2018 HEMOGLOBIN A1C Q6M 9/7/2018 PAP AKA CERVICAL CYTOLOGY 2/16/2019 EYE EXAM RETINAL OR DILATED Q1 8/7/2019 BREAST CANCER SCRN MAMMOGRAM 7/11/2020 DTaP/Tdap/Td series (2 - Td) 12/14/2025 Allergies as of 8/9/2018  Review Complete On: 8/9/2018 By: Ghanshyam De Luna MD  
 No Known Allergies Current Immunizations  Reviewed on 12/14/2015 Name Date Influenza Vaccine (Quad) PF 3/7/2018, 12/14/2015 Pneumococcal Polysaccharide (PPSV-23) 3/7/2018 Tdap 12/14/2015 Not reviewed this visit You Were Diagnosed With   
  
 Codes Comments Right leg pain    -  Primary ICD-10-CM: M79.604 ICD-9-CM: 729.5 Tenderness of hip, right     ICD-10-CM: M25.551 ICD-9-CM: 719.45 Essential hypertension     ICD-10-CM: I10 
ICD-9-CM: 401.9 Controlled type 2 diabetes mellitus without complication, without long-term current use of insulin ([facility] [address]     ICD-10-CM: E11.9 ICD-9-CM: 250.00 Vitals BP Pulse Temp Height(growth percentile) Weight(growth percentile) BMI  
 127/74 (BP 1 Location: Left arm) 74 98.5 °F (36.9 °C) (Oral) 5' 4.57\" (1.64 m) 248 lb (112.5 kg) 41.82 kg/m2 OB Status Smoking Status Postmenopausal Never Smoker Vitals History BMI and BSA Data Body Mass Index Body Surface Area  
 41.82 kg/m 2 2.26 m 2 Preferred Pharmacy Pharmacy Name Phone [facility] [phone] Your Updated Medication List  
  
   
This list is accurate as of 8/9/18 10:01 AM.  Always use your most recent med list.  
  
  
  
  
 atorvastatin 80 mg tablet Commonly known as:  LIPITOR Take 0.5 Tabs by mouth daily. naproxen 500 mg tablet Commonly known as:  NAPROSYN Take 1 Tab by mouth two (2) times daily as needed. nitrofurantoin (macrocrystal-monohydrate) 100 mg capsule Commonly known as:  MACROBID Take 1 Cap by mouth two (2) times a day. Prescriptions Sent to Pharmacy Refills  
 naproxen (NAPROSYN) 500 mg tablet 0 Sig: Take 1 Tab by mouth two (2) times daily as needed. Class: Normal  
 Pharmacy: [facility] [address] [facility]: [phone] Route: Oral  
  
Follow-up Instructions Return 2-3 months for leg pain follow up if symptoms worsen or fail to improve, for Diabetes follow up in 3 months. To-Do List   
 08/09/2018 Lab:  HEMOGLOBIN A1C WITH EAG   
  
 08/09/2018 Lab:  LIPID PANEL   
  
 08/09/2018 Lab:  METABOLIC PANEL, BASIC Patient Instructions Learning About a Hip Bursa Injection What is a hip bursa injection? A bursa is a small sac of fluid that cushions an area between tendons and bones. The bursa on the outer side of the hip bone is called the trochanteric (say \"KMBB-dlk-GHEJ-ik\") bursa. Sometimes it can become swollen and painful. This condition is called bursitis. An injection into the bursa is a shot of medicine to reduce pain and swelling. The medicines may include pain relievers and steroid medicines. A steroid shot can sometimes help with short-term pain relief when other treatments haven't worked. How is a hip bursa injection done? First the area over the bursa will be cleaned. Your doctor may use a tiny needle to numb the skin over the area where you will get the injection. If a tiny needle is used to numb the area, your doctor will use another needle to inject the medicine. Your doctor may use a pain reliever, a steroid, or both. You may feel some pressure or discomfort. The procedure takes 10 to 30 minutes. But the shot itself usually takes only a few minutes. Your doctor may put ice on the area before you go home. You will probably go home shortly after your shot. What can you expect after the injection? You may have numbness over your hip for a few hours. If your shot included both a pain reliever and a steroid, the pain will probably go away right away. But it might come back after a few hours. This might happen if the pain reliever wears off and the steroid has not started to work yet. The steroid medicine generally takes a few days to work. If the pain comes back, you can put ice or a cold pack on your hip for 10 to 20 minutes at a time. Put a thin cloth between the ice and your skin. Follow your doctor's instructions carefully. Avoid strenuous activities on the day you get the shot, especially those that put stress on your hip. Steroids don't always work. But when they do, the pain relief can last for several days to a few months or longer. Follow-up care is a key part of your treatment and safety. Be sure to make and go to all appointments, and call your doctor if you are having problems. It's also a good idea to know your test results and keep a list of the medicines you take. Where can you learn more? Go to http://anselmo-anne.info/. Enter Q924 in the search box to learn more about \"Learning About a Hip Bursa Injection. \" Current as of: November 29, 2017 Content Version: 11.7 © 8501-5621 [employer].  Care instructions adapted under license by [address] (which disclaims liability or warranty for this information). If you have questions about a medical condition or this instruction, always ask your healthcare professional. [address] any warranty or liability for your use of this information. Leg Pain: Care Instructions Your Care Instructions Many things can cause leg pain. Too much exercise or overuse can cause a muscle cramp (or charley horse). You can get leg cramps from not eating a balanced diet that has enough potassium, calcium, and other minerals. If you do not drink enough fluids or are taking certain medicines, you may develop leg cramps. Other causes of leg pain include injuries, blood flow problems, nerve damage, and twisted and enlarged veins (varicose veins). You can usually ease pain with self-care. Your doctor may recommend that you rest your leg and keep it elevated. Follow-up care is a key part of your treatment and safety. Be sure to make and go to all appointments, and call your doctor if you are having problems. It's also a good idea to know your test results and keep a list of the medicines you take. How can you care for yourself at home? · Take pain medicines exactly as directed. ¨ If the doctor gave you a prescription medicine for pain, take it as prescribed. ¨ If you are not taking a prescription pain medicine, ask your doctor if you can take an over-the-counter medicine. · Take any other medicines exactly as prescribed. Call your doctor if you think you are having a problem with your medicine. · Rest your leg while you have pain, and avoid standing for long periods of time. · Prop up your leg at or above the level of your heart when possible. · Make sure you are eating a balanced diet that is rich in calcium, potassium, and magnesium, especially if you are pregnant.  
· If directed by your doctor, put ice or a cold pack on the area for 10 to 20 minutes at a time. Put a thin cloth between the ice and your skin. · Your leg may be in a splint, a brace, or an elastic bandage, and you may have crutches to help you walk. Follow your doctor's directions about how long to wear supports and how to use the crutches. When should you call for help? Call 911 anytime you think you may need emergency care. For example, call if: 
  · You have sudden chest pain and shortness of breath, or you cough up blood.  
  · Your leg is cool or pale or changes color.  
 Call your doctor now or seek immediate medical care if: 
  · You have increasing or severe pain.  
  · Your leg suddenly feels weak and you cannot move it.  
  · You have signs of a blood clot, such as: 
¨ Pain in your calf, back of the knee, thigh, or groin. ¨ Redness and swelling in your leg or groin.  
  · You have signs of infection, such as: 
¨ Increased pain, swelling, warmth, or redness. ¨ Red streaks leading from the sore area. ¨ Pus draining from a place on your leg. ¨ A fever.  
  · You cannot bear weight on your leg.  
 Watch closely for changes in your health, and be sure to contact your doctor if: 
  · You do not get better as expected. Where can you learn more? Go to http://anselmo-anne.info/. Enter R101 in the search box to learn more about \"Leg Pain: Care Instructions. \" Current as of: November 20, 2017 Content Version: 11.7 © 3300-6360 [employer]. Care instructions adapted under license by [employer] (which disclaims liability or warranty for this information). If you have questions about a medical condition or this instruction, always ask your healthcare professional. [place] [address] any warranty or liability for your use of this information. Low Back Pain: Exercises Your Care Instructions Here are some examples of typical rehabilitation exercises for your condition. Start each exercise slowly. Ease off the exercise if you start to have pain. Your doctor or physical therapist will tell you when you can start these exercises and which ones will work best for you. How to do the exercises Press-up 1. Lie on your stomach, supporting your body with your forearms. 2. Press your elbows down into the floor to raise your upper back. As you do this, relax your stomach muscles and allow your back to arch without using your back muscles. As your press up, do not let your hips or pelvis come off the floor. 3. Hold for 15 to 30 seconds, then relax. 4. Repeat 2 to 4 times. Alternate arm and leg (bird dog) exercise Do this exercise slowly. Try to keep your body straight at all times, and do not let one hip drop lower than the other. 1. Start on the floor, on your hands and knees. 2. Tighten your belly muscles. 3. Raise one leg off the floor, and hold it straight out behind you. Be careful not to let your hip drop down, because that will twist your trunk. 4. Hold for about 6 seconds, then lower your leg and switch to the other leg. 5. Repeat 8 to 12 times on each leg. 6. Over time, work up to holding for 10 to 30 seconds each time. 7. If you feel stable and secure with your leg raised, try raising the opposite arm straight out in front of you at the same time. Knee-to-chest exercise 1. Lie on your back with your knees bent and your feet flat on the floor. 2. Bring one knee to your chest, keeping the other foot flat on the floor (or keeping the other leg straight, whichever feels better on your lower back). 3. Keep your lower back pressed to the floor. Hold for at least 15 to 30 seconds. 4. Relax, and lower the knee to the starting position. 5. Repeat with the other leg. Repeat 2 to 4 times with each leg. 6. To get more stretch, put your other leg flat on the floor while pulling your knee to your chest. 
Curl-ups 1. Lie on the floor on your back with your knees bent at a 90-degree angle. Your feet should be flat on the floor, about 12 inches from your buttocks. 2. Cross your arms over your chest. If this bothers your neck, try putting your hands behind your neck (not your head), with your elbows spread apart. 3. Slowly tighten your belly muscles and raise your shoulder blades off the floor. 4. Keep your head in line with your body, and do not press your chin to your chest. 
5. Hold this position for 1 or 2 seconds, then slowly lower yourself back down to the floor. 6. Repeat 8 to 12 times. Pelvic tilt exercise 1. Lie on your back with your knees bent. 2. \"Brace\" your stomach. This means to tighten your muscles by pulling in and imagining your belly button moving toward your spine. You should feel like your back is pressing to the floor and your hips and pelvis are rocking back. 3. Hold for about 6 seconds while you breathe smoothly. 4. Repeat 8 to 12 times. Heel dig bridging 1. Lie on your back with both knees bent and your ankles bent so that only your heels are digging into the floor. Your knees should be bent about 90 degrees. 2. Then push your heels into the floor, squeeze your buttocks, and lift your hips off the floor until your shoulders, hips, and knees are all in a straight line. 3. Hold for about 6 seconds as you continue to breathe normally, and then slowly lower your hips back down to the floor and rest for up to 10 seconds. 4. Do 8 to 12 repetitions. Hamstring stretch in doorway 1. Lie on your back in a doorway, with one leg through the open door. 2. Slide your leg up the wall to straighten your knee. You should feel a gentle stretch down the back of your leg. 3. Hold the stretch for at least 15 to 30 seconds. Do not arch your back, point your toes, or bend either knee. Keep one heel touching the floor and the other heel touching the wall. 4. Repeat with your other leg. 5. Do 2 to 4 times for each leg. Hip flexor stretch 1. Kneel on the floor with one knee bent and one leg behind you. Place your forward knee over your foot. Keep your other knee touching the floor. 2. Slowly push your hips forward until you feel a stretch in the upper thigh of your rear leg. 3. Hold the stretch for at least 15 to 30 seconds. Repeat with your other leg. 4. Do 2 to 4 times on each side. Wall sit 1. Stand with your back 10 to 12 inches away from a wall. 2. Lean into the wall until your back is flat against it. 3. Slowly slide down until your knees are slightly bent, pressing your lower back into the wall. 4. Hold for about 6 seconds, then slide back up the wall. 5. Repeat 8 to 12 times. Follow-up care is a key part of your treatment and safety. Be sure to make and go to all appointments, and call your doctor if you are having problems. It's also a good idea to know your test results and keep a list of the medicines you take. Where can you learn more? Go to http://anselmo-anne.info/. Enter F480 in the search box to learn more about \"Low Back Pain: Exercises. \" Current as of: November 29, 2017 Content Version: 11.7 © 4468-6119 [employer]. Care instructions adapted under license by [employer] (which disclaims liability or warranty for this information). If you have questions about a medical condition or this instruction, always ask your healthcare professional. [place] [address] any warranty or liability for your use of this information. Introducing [facility]! [facility] introduces [address] patient portal. Now you can access parts of your medical record, email your doctor's office, and request medication refills online. 1. In your internet browser, go to [email or web address] 2. Click on the First Time User? Click Here link in the Sign In box. You will see the New Member Sign Up page. 3. Enter your [employer] Access Code exactly as it appears below. You will not need to use this code after youve completed the sign-up process. If you do not sign up before the expiration date, you must request a new code. · [employer] Access Code: [email or web address] Expires: 10/7/2018 10:11 AM 
 
4. Enter the last four digits of your Social Security Number (xxxx) and Date of Birth (mm/dd/yyyy) as indicated and click Submit. You will be taken to the next sign-up page. 5. Create a [employer] ID. This will be your [employer] login ID and cannot be changed, so think of one that is secure and easy to remember. 6. Create a [employer] password. You can change your password at any time. 7. Enter your Password Reset Question and Answer. This can be used at a later time if you forget your password. 8. Enter your e-mail address. You will receive e-mail notification when new information is available in [address] 9. Click Sign Up. You can now view and download portions of your medical record. 10. Click the Download Summary menu link to download a portable copy of your medical information. If you have questions, please visit the Frequently Asked Questions section of the [employer] website. Remember, [employer] is NOT to be used for urgent needs. For medical emergencies, dial 911. Now available from your iPhone and Android! Please provide this summary of care documentation to your next provider. Your primary care clinician is listed as Brenda Murray. Shanelle Dorantes. If you have any questions after today's visit, please call [phone].

## 2018-08-09 NOTE — PATIENT INSTRUCTIONS
Learning About a Hip Bursa Injection  What is a hip bursa injection? A bursa is a small sac of fluid that cushions an area between tendons and bones. The bursa on the outer side of the hip bone is called the trochanteric (say \"[id number]\") bursa. Sometimes it can become swollen and painful. This condition is called bursitis. An injection into the bursa is a shot of medicine to reduce pain and swelling. The medicines may include pain relievers and steroid medicines. A steroid shot can sometimes help with short-term pain relief when other treatments haven't worked. How is a hip bursa injection done? First the area over the bursa will be cleaned. Your doctor may use a tiny needle to numb the skin over the area where you will get the injection. If a tiny needle is used to numb the area, your doctor will use another needle to inject the medicine. Your doctor may use a pain reliever, a steroid, or both. You may feel some pressure or discomfort. The procedure takes 10 to 30 minutes. But the shot itself usually takes only a few minutes. Your doctor may put ice on the area before you go home. You will probably go home shortly after your shot. What can you expect after the injection? You may have numbness over your hip for a few hours. If your shot included both a pain reliever and a steroid, the pain will probably go away right away. But it might come back after a few hours. This might happen if the pain reliever wears off and the steroid has not started to work yet. The steroid medicine generally takes a few days to work. If the pain comes back, you can put ice or a cold pack on your hip for 10 to 20 minutes at a time. Put a thin cloth between the ice and your skin. Follow your doctor's instructions carefully. Avoid strenuous activities on the day you get the shot, especially those that put stress on your hip. Steroids don't always work.  But when they do, the pain relief can last for several days to a few months or longer. Follow-up care is a key part of your treatment and safety. Be sure to make and go to all appointments, and call your doctor if you are having problems. It's also a good idea to know your test results and keep a list of the medicines you take. Where can you learn more? Go to http://anselmo-anne.info/. Enter F263 in the search box to learn more about \"Learning About a Hip Bursa Injection. \"  Current as of: November 29, 2017  Content Version: 11.7  © 4956-1392 [employer]. Care instructions adapted under license by [employer] (which disclaims liability or warranty for this information). If you have questions about a medical condition or this instruction, always ask your healthcare professional. [address] any warranty or liability for your use of this information. Leg Pain: Care Instructions  Your Care Instructions  Many things can cause leg pain. Too much exercise or overuse can cause a muscle cramp (or charley horse). You can get leg cramps from not eating a balanced diet that has enough potassium, calcium, and other minerals. If you do not drink enough fluids or are taking certain medicines, you may develop leg cramps. Other causes of leg pain include injuries, blood flow problems, nerve damage, and twisted and enlarged veins (varicose veins). You can usually ease pain with self-care. Your doctor may recommend that you rest your leg and keep it elevated. Follow-up care is a key part of your treatment and safety. Be sure to make and go to all appointments, and call your doctor if you are having problems. It's also a good idea to know your test results and keep a list of the medicines you take. How can you care for yourself at home? · Take pain medicines exactly as directed. ¨ If the doctor gave you a prescription medicine for pain, take it as prescribed.   ¨ If you are not taking a prescription pain medicine, ask your doctor if you can take an over-the-counter medicine. · Take any other medicines exactly as prescribed. Call your doctor if you think you are having a problem with your medicine. · Rest your leg while you have pain, and avoid standing for long periods of time. · Prop up your leg at or above the level of your heart when possible. · Make sure you are eating a balanced diet that is rich in calcium, potassium, and magnesium, especially if you are pregnant. · If directed by your doctor, put ice or a cold pack on the area for 10 to 20 minutes at a time. Put a thin cloth between the ice and your skin. · Your leg may be in a splint, a brace, or an elastic bandage, and you may have crutches to help you walk. Follow your doctor's directions about how long to wear supports and how to use the crutches. When should you call for help? Call 911 anytime you think you may need emergency care. For example, call if:    · You have sudden chest pain and shortness of breath, or you cough up blood.     · Your leg is cool or pale or changes color.    Call your doctor now or seek immediate medical care if:    · You have increasing or severe pain.     · Your leg suddenly feels weak and you cannot move it.     · You have signs of a blood clot, such as:  ¨ Pain in your calf, back of the knee, thigh, or groin. ¨ Redness and swelling in your leg or groin.     · You have signs of infection, such as:  ¨ Increased pain, swelling, warmth, or redness. ¨ Red streaks leading from the sore area. ¨ Pus draining from a place on your leg. ¨ A fever.     · You cannot bear weight on your leg.    Watch closely for changes in your health, and be sure to contact your doctor if:    · You do not get better as expected. Where can you learn more? Go to http://anselmo-anne.info/. Enter M840 in the search box to learn more about \"Leg Pain: Care Instructions. \"  Current as of: November 20, 2017  Content Version: 11.7  © 4418-8661 Healthwise, [employer]. Care instructions adapted under license by [employer] (which disclaims liability or warranty for this information). If you have questions about a medical condition or this instruction, always ask your healthcare professional. [address] any warranty or liability for your use of this information. Low Back Pain: Exercises  Your Care Instructions  Here are some examples of typical rehabilitation exercises for your condition. Start each exercise slowly. Ease off the exercise if you start to have pain. Your doctor or physical therapist will tell you when you can start these exercises and which ones will work best for you. How to do the exercises  Press-up    1. Lie on your stomach, supporting your body with your forearms. 2. Press your elbows down into the floor to raise your upper back. As you do this, relax your stomach muscles and allow your back to arch without using your back muscles. As your press up, do not let your hips or pelvis come off the floor. 3. Hold for 15 to 30 seconds, then relax. 4. Repeat 2 to 4 times. Alternate arm and leg (bird dog) exercise    Do this exercise slowly. Try to keep your body straight at all times, and do not let one hip drop lower than the other. 1. Start on the floor, on your hands and knees. 2. Tighten your belly muscles. 3. Raise one leg off the floor, and hold it straight out behind you. Be careful not to let your hip drop down, because that will twist your trunk. 4. Hold for about 6 seconds, then lower your leg and switch to the other leg. 5. Repeat 8 to 12 times on each leg. 6. Over time, work up to holding for 10 to 30 seconds each time. 7. If you feel stable and secure with your leg raised, try raising the opposite arm straight out in front of you at the same time. Knee-to-chest exercise    1. Lie on your back with your knees bent and your feet flat on the floor.   2. Bring one knee to your chest, keeping the other foot flat on the floor (or keeping the other leg straight, whichever feels better on your lower back). 3. Keep your lower back pressed to the floor. Hold for at least 15 to 30 seconds. 4. Relax, and lower the knee to the starting position. 5. Repeat with the other leg. Repeat 2 to 4 times with each leg. 6. To get more stretch, put your other leg flat on the floor while pulling your knee to your chest.  Curl-ups    1. Lie on the floor on your back with your knees bent at a 90-degree angle. Your feet should be flat on the floor, about 12 inches from your buttocks. 2. Cross your arms over your chest. If this bothers your neck, try putting your hands behind your neck (not your head), with your elbows spread apart. 3. Slowly tighten your belly muscles and raise your shoulder blades off the floor. 4. Keep your head in line with your body, and do not press your chin to your chest.  5. Hold this position for 1 or 2 seconds, then slowly lower yourself back down to the floor. 6. Repeat 8 to 12 times. Pelvic tilt exercise    1. Lie on your back with your knees bent. 2. \"Brace\" your stomach. This means to tighten your muscles by pulling in and imagining your belly button moving toward your spine. You should feel like your back is pressing to the floor and your hips and pelvis are rocking back. 3. Hold for about 6 seconds while you breathe smoothly. 4. Repeat 8 to 12 times. Heel dig bridging    1. Lie on your back with both knees bent and your ankles bent so that only your heels are digging into the floor. Your knees should be bent about 90 degrees. 2. Then push your heels into the floor, squeeze your buttocks, and lift your hips off the floor until your shoulders, hips, and knees are all in a straight line. 3. Hold for about 6 seconds as you continue to breathe normally, and then slowly lower your hips back down to the floor and rest for up to 10 seconds.   4. Do 8 to 12 repetitions. Hamstring stretch in doorway    1. Lie on your back in a doorway, with one leg through the open door. 2. Slide your leg up the wall to straighten your knee. You should feel a gentle stretch down the back of your leg. 3. Hold the stretch for at least 15 to 30 seconds. Do not arch your back, point your toes, or bend either knee. Keep one heel touching the floor and the other heel touching the wall. 4. Repeat with your other leg. 5. Do 2 to 4 times for each leg. Hip flexor stretch    1. Kneel on the floor with one knee bent and one leg behind you. Place your forward knee over your foot. Keep your other knee touching the floor. 2. Slowly push your hips forward until you feel a stretch in the upper thigh of your rear leg. 3. Hold the stretch for at least 15 to 30 seconds. Repeat with your other leg. 4. Do 2 to 4 times on each side. Wall sit    1. Stand with your back 10 to 12 inches away from a wall. 2. Lean into the wall until your back is flat against it. 3. Slowly slide down until your knees are slightly bent, pressing your lower back into the wall. 4. Hold for about 6 seconds, then slide back up the wall. 5. Repeat 8 to 12 times. Follow-up care is a key part of your treatment and safety. Be sure to make and go to all appointments, and call your doctor if you are having problems. It's also a good idea to know your test results and keep a list of the medicines you take. Where can you learn more? Go to http://anselmo-anne.info/. Enter X877 in the search box to learn more about \"Low Back Pain: Exercises. \"  Current as of: November 29, 2017  Content Version: 11.7  © 7117-1360 [employer]. Care instructions adapted under license by [employer] (which disclaims liability or warranty for this information).  If you have questions about a medical condition or this instruction, always ask your healthcare professional. [address]  any warranty or liability for your use of this information.

## 2018-08-10 NOTE — PROGRESS NOTES
Nurse to please call and inform patient of the following (** represent recommendations for patient) - thank you:  A1c 6.4% - stable, well controlled with diet  BMP wnl, no kidney, sugar or electrolyte problems identified  Lipid panel - total chol and LDL quite elevated  ** If she has been taking her lipitor 40mg nightly, then I recommend increasing to full tab (80mg) nightly.  If she has been missing doses, then just be sure to take the 40mg consistently and work on weight loss, low carb/low sat fat diet

## 2018-08-10 NOTE — PROGRESS NOTES
RN called pt's home and talked with her daughter, Gifty Olivas (on [employer]), as pt was not at home. RN shared all of Dr. Fabiola Reyna message regarding lab results. The daughter stated that her mother has told her she takes her Lipitor every night. RN explained that per Dr. Fabiola Reyna, pt should now take the full tab, 80 mg nightly. Pt's daughter expressed understanding of the above information. Maria Del Carmen Garduno.

## 2018-08-13 ENCOUNTER — TELEPHONE (OUTPATIENT)
Dept: FAMILY MEDICINE CLINIC | Age: 58
End: 2018-08-13

## 2018-09-26 ENCOUNTER — OFFICE VISIT (OUTPATIENT)
Dept: FAMILY MEDICINE CLINIC | Age: 58
End: 2018-09-26

## 2018-09-26 VITALS
HEIGHT: 65 IN | BODY MASS INDEX: 41.15 KG/M2 | TEMPERATURE: 98.5 F | SYSTOLIC BLOOD PRESSURE: 135 MMHG | DIASTOLIC BLOOD PRESSURE: 77 MMHG | WEIGHT: 247 LBS

## 2018-09-26 DIAGNOSIS — N95.0 POST-MENOPAUSAL BLEEDING: Primary | ICD-10-CM

## 2018-09-26 NOTE — MR AVS SNAPSHOT
[address] [place] [address] [address] 
[phone] Patient: Andrzej Cordero MRN: [id number] GSD:[birth year]11 Visit Information Date & Time Provider Department Dept. Phone Encounter #  
 9/26/2018 10:10 AM [facility] [address], [address] [facility] [phone] [id number] Follow-up Instructions Return in about 3 months (around 12/26/2018), or if symptoms worsen or fail to improve. Upcoming Health Maintenance Date Due Shingrix Vaccine Age 50> (1 of 2) 5/5/2008 MICROALBUMIN Q1 3/10/2017 FOOT EXAM Q1 4/11/2017 FOBT Q 1 YEAR AGE 50-75 4/11/2017 Influenza Age 5 to Adult 8/1/2018 HEMOGLOBIN A1C Q6M 2/9/2019 PAP AKA CERVICAL CYTOLOGY 2/16/2019 EYE EXAM RETINAL OR DILATED Q1 8/7/2019 LIPID PANEL Q1 8/9/2019 BREAST CANCER SCRN MAMMOGRAM 7/11/2020 DTaP/Tdap/Td series (2 - Td) 12/14/2025 Allergies as of 9/26/2018  Review Complete On: 8/9/2018 By: Heaven Rachel MD  
  
 Severity Noted Reaction Type Reactions Ibuprofen  09/26/2018    Nausea Only Current Immunizations  Reviewed on 12/14/2015 Name Date Influenza Vaccine (Quad) PF 3/7/2018, 12/14/2015 Pneumococcal Polysaccharide (PPSV-23) 3/7/2018 Tdap 12/14/2015 Not reviewed this visit You Were Diagnosed With   
  
 Codes Comments Post-menopausal bleeding    -  Primary ICD-10-CM: N95.0 ICD-9-CM: 627.1 Vitals BP Temp Height(growth percentile) Weight(growth percentile) BMI OB Status 135/77 (BP 1 Location: Left arm, BP Patient Position: Sitting) 98.5 °F (36.9 °C) 5' 4.76\" (1.645 m) 247 lb (112 kg) 41.4 kg/m2 Postmenopausal  
 Smoking Status Never Smoker BMI and BSA Data Body Mass Index Body Surface Area  
 41.4 kg/m 2 2.26 m 2 Preferred Pharmacy Pharmacy Name Phone Nikolas Duty [address] [facility] [phone] Your Updated Medication List  
  
   
This list is accurate as of 9/26/18 10:54 AM.  Always use your most recent med list.  
  
  
  
  
 atorvastatin 80 mg tablet Commonly known as:  LIPITOR Take 0.5 Tabs by mouth daily. naproxen 500 mg tablet Commonly known as:  NAPROSYN Take 1 Tab by mouth two (2) times daily as needed. nitrofurantoin (macrocrystal-monohydrate) 100 mg capsule Commonly known as:  MACROBID Take 1 Cap by mouth two (2) times a day. We Performed the Following REFERRAL TO GYNECOLOGY [REF30 Custom] Comments:  
 Access Now Referral Request Form: 
 
Has the patient live in the area for 6 months y Is the patient here on a visa n 
 
Priority: 
 
2weeks Location: [facility] Patient Demographics: 
 
Date:  9/26/2018                          EMR# [id number] Rohan Brown 1960 Home Phone : [phone]             Cell Phone:   
 
Language :  English and native dialect Specialist Requested:  gyn Reason for Request:  Post menopausal bleeding Specialist Requirements: 
 
If GYN Referral:   
Pap:  Yes up to date Referring Provider: REED Ko Follow-up Instructions Return in about 3 months (around 12/26/2018), or if symptoms worsen or fail to improve. Referral Information Referral ID Referred By Referred To  
  
 [id number] Jm KEN Not Available Visits Status Start Date End Date 1 New Request 9/26/18 9/26/19 If your referral has a status of pending review or denied, additional information will be sent to support the outcome of this decision. Patient Instructions Vaginal Bleeding After Menopause: Care Instructions Your Care Instructions Vaginal bleeding after menopause can have many causes. Causes may include infection, inflammation, prescription hormones, abnormal growths, and injury.  Your doctor may want you to have more tests to find the cause of your vaginal bleeding. Follow-up care is a key part of your treatment and safety. Be sure to make and go to all appointments, and call your doctor if you are having problems. It's also a good idea to know your test results and keep a list of the medicines you take. How can you care for yourself at home? · If your doctor gave you medicine, take it exactly as prescribed. Call your doctor if you think you are having a problem with your medicine. · Do not have sex or put anything inside your vagina until you talk with your doctor. · Do not douche. When should you call for help? Call 911 anytime you think you may need emergency care. For example, call if: 
  · You passed out (lost consciousness).  
 Call your doctor now or seek immediate medical care if: 
  · You have severe vaginal bleeding.  
  · You are dizzy or lightheaded, or you feel like you may faint.  
  · You have new or worse belly or pelvic pain.  
 Watch closely for changes in your health, and be sure to contact your doctor if: 
  · Your bleeding gets worse.  
  · You think you might be pregnant.  
  · You do not get better as expected. Where can you learn more? Go to http://anselmo-anne.info/. Enter N304 in the search box to learn more about \"Vaginal Bleeding After Menopause: Care Instructions. \" Current as of: October 6, 2017 Content Version: 11.7 © 9812-9910 [employer]. Care instructions adapted under license by [employer] (which disclaims liability or warranty for this information). If you have questions about a medical condition or this instruction, always ask your healthcare professional. [place] [address] any warranty or liability for your use of this information. Introducing [facility]! [facility] [employer] [facility] introduces Zyncro patient portal. Now you can access parts of your medical record, email your doctor's office, and request medication refills online. 1. In your internet browser, go to [email or web address] 2. Click on the First Time User? Click Here link in the Sign In box. You will see the New Member Sign Up page. 3. Enter your [employer] Access Code exactly as it appears below. You will not need to use this code after youve completed the sign-up process. If you do not sign up before the expiration date, you must request a new code. · [employer] Access Code: [email or web address] Expires: 10/7/2018 10:11 AM 
 
4. Enter the last four digits of your Social Security Number (xxxx) and Date of Birth (mm/dd/yyyy) as indicated and click Submit. You will be taken to the next sign-up page. 5. Create a RIGIDt ID. This will be your [employer] login ID and cannot be changed, so think of one that is secure and easy to remember. 6. Create a [employer] password. You can change your password at any time. 7. Enter your Password Reset Question and Answer. This can be used at a later time if you forget your password. 8. Enter your e-mail address. You will receive e-mail notification when new information is available in [address] 9. Click Sign Up. You can now view and download portions of your medical record. 10. Click the Download Summary menu link to download a portable copy of your medical information. If you have questions, please visit the Frequently Asked Questions section of the [employer] website. Remember, [employer] is NOT to be used for urgent needs. For medical emergencies, dial 911. Now available from your iPhone and Android! Please provide this summary of care documentation to your next provider. Your primary care clinician is listed as Severo Sloan. Tana Ruffin. If you have any questions after today's visit, please call [phone].

## 2018-09-26 NOTE — PROGRESS NOTES
Coordination of Care  1. Have you been to the ER, urgent care clinic since your last visit? Hospitalized since your last visit? No    2. Have you seen or consulted any other health care providers outside of the [address] [place] [address] since your last visit? Include any pap smears or colon screening. No    Does the patient need refills? NO    Learning Assessment Complete?  yes  Depression Screening complete in the past 12 months? yes

## 2018-09-26 NOTE — PROGRESS NOTES
Assessment/Plan:    Diagnoses and all orders for this visit:    1. Post-menopausal bleeding  -     REFERRAL TO GYNECOLOGY        Follow-up Disposition:  Return in about 3 months (around 12/26/2018), or if symptoms worsen or fail to improve. JAYLENE Iraheta expressed understanding of this plan. An AVS was printed and given to the patient.      ----------------------------------------------------------------------    Chief Complaint   Patient presents with    Vaginal Bleeding     for about 2 weeks       History of Present Illness:  60 yo post menopausal pt (last period around 2013) had her annual gyn exam in 7/18 and all was well at that time. Then, 2 weeks ago, she started to have painless vaginal bleeding like \"a heavy period\". She used most of 2 full packs of pads until the bleeding stopped a few days ago. She had one episode of PMB in 2014 and had an endometrial bx which was negative (results in media section of her chart) . She is not having any urinary sxs. She is not having blood per rectum. She is currently not having any more vaginal bleeding  mammo was done 7/18 at [facility] exam [facility]  Please include her exam from her [facility] visit with this referral      Past Medical History:   Diagnosis Date    Essential hypertension     Hyperlipidemia     Morbid [facility] [address]     Type 2 diabetes mellitus ([facility] [address] dx 1/2016    with a1c 6.7%       Current Outpatient Prescriptions   Medication Sig Dispense Refill    naproxen (NAPROSYN) 500 mg tablet Take 1 Tab by mouth two (2) times daily as needed. 60 Tab 0    nitrofurantoin, macrocrystal-monohydrate, (MACROBID) 100 mg capsule Take 1 Cap by mouth two (2) times a day. 14 Cap 0    atorvastatin (LIPITOR) 80 mg tablet Take 0.5 Tabs by mouth daily.  90 Tab 3       Allergies   Allergen Reactions    Ibuprofen Nausea Only       Social History   Substance Use Topics    Smoking status: Never Smoker    Smokeless tobacco: Never Used    Alcohol use No       No family history on file. Physical Exam:     Visit Vitals    /77 (BP 1 Location: Left arm, BP Patient Position: Sitting)    Temp 98.5 °F (36.9 °C)    Ht 5' 4.76\" (1.645 m)    Wt 247 lb (112 kg)    BMI 41.4 kg/m2       A&Ox3  WDWN NAD  Respirations normal and non labored  [facility] Results (most recent):    Results from Hospital Encounter encounter on 07/11/18   [facility] MAMMO BI SCREENING INCL CAD   Narrative STUDY: Bilateral digital screening mammogram    INDICATION:  Screening. COMPARISON:  Prior studies dating back to 2015    BREAST COMPOSITION:  The breasts are almost entirely fatty. FINDINGS: Bilateral digital screening mammography was performed and is  interpreted in conjunction with a computer assisted detection (CAD) system. No  suspicious masses or calcifications are identified. There has been no  significant change. Impression IMPRESSION:  BI-RADS 1: Negative. No mammographic evidence of malignancy. RECOMMENDATIONS:  Next screening mammogram is recommended in one year. The patient will be notified of these results.           Last pap 2016 which was normal

## 2018-09-26 NOTE — PATIENT INSTRUCTIONS
Vaginal Bleeding After Menopause: Care Instructions  Your Care Instructions    Vaginal bleeding after menopause can have many causes. Causes may include infection, inflammation, prescription hormones, abnormal growths, and injury. Your doctor may want you to have more tests to find the cause of your vaginal bleeding. Follow-up care is a key part of your treatment and safety. Be sure to make and go to all appointments, and call your doctor if you are having problems. It's also a good idea to know your test results and keep a list of the medicines you take. How can you care for yourself at home? · If your doctor gave you medicine, take it exactly as prescribed. Call your doctor if you think you are having a problem with your medicine. · Do not have sex or put anything inside your vagina until you talk with your doctor. · Do not douche. When should you call for help? Call 911 anytime you think you may need emergency care. For example, call if:    · You passed out (lost consciousness).    Call your doctor now or seek immediate medical care if:    · You have severe vaginal bleeding.     · You are dizzy or lightheaded, or you feel like you may faint.     · You have new or worse belly or pelvic pain.    Watch closely for changes in your health, and be sure to contact your doctor if:    · Your bleeding gets worse.     · You think you might be pregnant.     · You do not get better as expected. Where can you learn more? Go to http://anselmo-anne.info/. Enter N304 in the search box to learn more about \"Vaginal Bleeding After Menopause: Care Instructions. \"  Current as of: October 6, 2017  Content Version: 11.7  © 6295-4107 [employer]. Care instructions adapted under license by [employer] (which disclaims liability or warranty for this information).  If you have questions about a medical condition or this instruction, always ask your healthcare professional. [employer] disclaims any warranty or liability for your use of this information.

## 2018-10-05 ENCOUNTER — TELEPHONE (OUTPATIENT)
Dept: FAMILY MEDICINE CLINIC | Age: 58
End: 2018-10-05

## 2018-10-05 NOTE — TELEPHONE ENCOUNTER
10/4/18 patient daughter Ms. Boogie Austin left a message requesting information about the status of the referral for the GYN specialist for patient. Chart reviewed and patient is up to date on the AN. A message has been sent to the [facility] nurse at [facility] to follow up about it and per [facility] RN, they will call the patient within 1-2 weeks after the packet has been sent to AN to let patient know to call for an appt. With the specialist.    Nurse called patient home/cell number, daughter of patient listed as emergency contact and on PHI answered the phone, above information discussed with daughter. Ms. Boogie Austin also tells me that patient continues to have vaginal spotting, no need to use pads but the spotting is \"mostly when she wipes in her private part\" Nurse inquire to daughter if the patient is having any other symptoms like dizziness and daughter answered \"no\". No other questions or concerns from patient's daughter at this time. Routing to provider for review.  Fannie Shea, RN

## 2019-03-05 ENCOUNTER — DOCUMENTATION ONLY (OUTPATIENT)
Dept: FAMILY MEDICINE CLINIC | Age: 59
End: 2019-03-05

## 2019-03-05 NOTE — PROGRESS NOTES
Daughter of patient, Vania Carlos, left a message that she wanted to set up an appointment for her. She is a former [facility] patient. I returned call and left message to call me back.     Faby Pope

## 2019-06-05 ENCOUNTER — OFFICE VISIT (OUTPATIENT)
Dept: FAMILY MEDICINE CLINIC | Age: 59
End: 2019-06-05

## 2019-06-05 ENCOUNTER — HOSPITAL ENCOUNTER (OUTPATIENT)
Dept: GENERAL RADIOLOGY | Age: 59
Discharge: HOME | End: 2019-06-05
Payer: SELF-PAY

## 2019-06-05 ENCOUNTER — HOSPITAL ENCOUNTER (OUTPATIENT)
Dept: LAB | Age: 59
Discharge: HOME | End: 2019-06-05

## 2019-06-05 VITALS
HEIGHT: 64 IN | TEMPERATURE: 98.4 F | BODY MASS INDEX: 44.35 KG/M2 | SYSTOLIC BLOOD PRESSURE: 146 MMHG | DIASTOLIC BLOOD PRESSURE: 77 MMHG | OXYGEN SATURATION: 98 % | WEIGHT: 259.8 LBS | HEART RATE: 74 BPM

## 2019-06-05 DIAGNOSIS — E11.9 CONTROLLED TYPE 2 DIABETES MELLITUS WITHOUT COMPLICATION, WITHOUT LONG-TERM CURRENT USE OF INSULIN (HCC): ICD-10-CM

## 2019-06-05 DIAGNOSIS — E78.2 MIXED HYPERLIPIDEMIA: ICD-10-CM

## 2019-06-05 DIAGNOSIS — G89.29 CHRONIC LEFT-SIDED LOW BACK PAIN WITH LEFT-SIDED SCIATICA: Primary | ICD-10-CM

## 2019-06-05 DIAGNOSIS — G89.29 CHRONIC LEFT-SIDED LOW BACK PAIN WITH LEFT-SIDED SCIATICA: ICD-10-CM

## 2019-06-05 DIAGNOSIS — M54.42 CHRONIC LEFT-SIDED LOW BACK PAIN WITH LEFT-SIDED SCIATICA: ICD-10-CM

## 2019-06-05 DIAGNOSIS — I10 ESSENTIAL HYPERTENSION: ICD-10-CM

## 2019-06-05 DIAGNOSIS — M54.42 CHRONIC LEFT-SIDED LOW BACK PAIN WITH LEFT-SIDED SCIATICA: Primary | ICD-10-CM

## 2019-06-05 LAB
ALBUMIN SERPL-MCNC: 3.8 G/DL (ref 3.5–5)
ALBUMIN/GLOB SERPL: 1 {RATIO} (ref 1.1–2.2)
ALP SERPL-CCNC: 77 U/L (ref 45–117)
ALT SERPL-CCNC: 15 U/L (ref 12–78)
ANION GAP SERPL CALC-SCNC: 5 MMOL/L (ref 5–15)
AST SERPL-CCNC: 9 U/L (ref 15–37)
BILIRUB SERPL-MCNC: 0.5 MG/DL (ref 0.2–1)
BUN SERPL-MCNC: 12 MG/DL (ref 6–20)
BUN/CREAT SERPL: 14 (ref 12–20)
CALCIUM SERPL-MCNC: 9.5 MG/DL (ref 8.5–10.1)
CHLORIDE SERPL-SCNC: 107 MMOL/L (ref 97–108)
CHOLEST SERPL-MCNC: 280 MG/DL
CO2 SERPL-SCNC: 29 MMOL/L (ref 21–32)
CREAT SERPL-MCNC: 0.87 MG/DL (ref 0.55–1.02)
GLOBULIN SER CALC-MCNC: 3.8 G/DL (ref 2–4)
GLUCOSE SERPL-MCNC: 102 MG/DL (ref 65–100)
HDLC SERPL-MCNC: 69 MG/DL
HDLC SERPL: 4.1 {RATIO} (ref 0–5)
LDLC SERPL CALC-MCNC: 191.8 MG/DL (ref 0–100)
LIPID PROFILE,FLP: ABNORMAL
POTASSIUM SERPL-SCNC: 4.5 MMOL/L (ref 3.5–5.1)
PROT SERPL-MCNC: 7.6 G/DL (ref 6.4–8.2)
SODIUM SERPL-SCNC: 141 MMOL/L (ref 136–145)
TRIGL SERPL-MCNC: 96 MG/DL (ref ?–150)
VLDLC SERPL CALC-MCNC: 19.2 MG/DL

## 2019-06-05 PROCEDURE — 83036 HEMOGLOBIN GLYCOSYLATED A1C: CPT

## 2019-06-05 PROCEDURE — 82043 UR ALBUMIN QUANTITATIVE: CPT

## 2019-06-05 PROCEDURE — 80053 COMPREHEN METABOLIC PANEL: CPT

## 2019-06-05 PROCEDURE — 80061 LIPID PANEL: CPT

## 2019-06-05 PROCEDURE — 72100 X-RAY EXAM L-S SPINE 2/3 VWS: CPT

## 2019-06-05 RX ORDER — ATORVASTATIN CALCIUM 40 MG/1
40 TABLET, FILM COATED ORAL DAILY
Qty: 90 TAB | Refills: 3 | Status: SHIPPED | OUTPATIENT
Start: 2019-06-05 | End: 2019-12-04

## 2019-06-05 RX ORDER — GABAPENTIN 300 MG/1
300 CAPSULE ORAL 2 TIMES DAILY
Qty: 60 CAP | Refills: 3 | Status: SHIPPED | OUTPATIENT
Start: 2019-06-05 | End: 2019-08-21

## 2019-06-05 RX ORDER — LISINOPRIL 10 MG/1
10 TABLET ORAL DAILY
Qty: 90 TAB | Refills: 3 | Status: SHIPPED | OUTPATIENT
Start: 2019-06-05 | End: 2019-12-04

## 2019-06-05 NOTE — PROGRESS NOTES
HISTORY OF PRESENT ILLNESS  Diaz Lucio is a 62 y.o. female. HPI  Patient is having pain in the back and legs. The legs are sensitive to light touch. Ran out of cholesterol medication too. She was taking a blood pressure medication but when she last saw a doctor was told she didn't need blood pressure medication,  And now is high again. Patient states she used to be seen at [facility] and since it closed, she has not been able to get her cholesterol medication. Review of Systems   Constitutional: Negative for chills, fever and weight loss. HENT: Negative for ear pain, hearing loss and tinnitus. Respiratory: Negative for cough, hemoptysis and sputum production. Cardiovascular: Negative for chest pain, palpitations and orthopnea. Gastrointestinal: Negative for heartburn, nausea and vomiting. Genitourinary: Negative for dysuria, frequency, hematuria and urgency. Musculoskeletal: Positive for back pain. Back pain, radiating to the left lower extremity   Skin: Negative for itching and rash. Neurological: Negative for dizziness, tingling, tremors and headaches. Psychiatric/Behavioral: Negative for depression, hallucinations, substance abuse and suicidal ideas. /77 (BP 1 Location: Right arm, BP Patient Position: Sitting)   Pulse 74   Temp 98.4 °F (36.9 °C) (Oral)   Ht 5' 4.17\" (1.63 m)   Wt 259 lb 12.8 oz (117.8 kg)   SpO2 98%   BMI 44.35 kg/m²   Physical Exam   Constitutional: She appears well-developed and well-nourished. HENT:   Head: Normocephalic. Right Ear: External ear normal.   Left Ear: External ear normal.   Mouth/Throat: No oropharyngeal exudate. Eyes: Pupils are equal, round, and reactive to light. Conjunctivae are normal.   Neck: Normal range of motion. Neck supple. No tracheal deviation present. No thyromegaly present. Cardiovascular: Normal rate, regular rhythm and normal heart sounds. No murmur heard.   Pulmonary/Chest: Effort normal and breath sounds normal. No respiratory distress. She has no wheezes. She has no rales. Abdominal: Soft. She exhibits no distension. There is no tenderness. There is no rebound. Musculoskeletal: Normal range of motion. She exhibits tenderness. She exhibits no edema. Lumbar pain to palpation, muscles feels stiff   Neurological: She is alert. Skin: Skin is warm. No erythema. ASSESSMENT and PLAN  Diagnoses and all orders for this visit:    1. Chronic left-sided low back pain with left-sided sciatica  -     XR SPINE LUMB 2 OR 3 V; Future  -     gabapentin (NEURONTIN) 300 mg capsule; Take 1 Cap by mouth two (2) times a day. -     REFERRAL TO PHYSICAL THERAPY    2. Controlled type 2 diabetes mellitus without complication, without long-term current use of insulin ([facility])  -     METABOLIC PANEL, COMPREHENSIVE; Future  -     MICROALBUMIN, UR, RAND W/ MICROALB/CREAT RATIO  -     HEMOGLOBIN A1C WITH EAG; Future    3. Essential hypertension  -     lisinopril (PRINIVIL, ZESTRIL) 10 mg tablet; Take 1 Tab by mouth daily. 4. Mixed hyperlipidemia  -     LIPID PANEL; Future  -     atorvastatin (LIPITOR) 40 mg tablet; Take 1 Tab by mouth daily. Will get x ray of lumbar spine,  Refer to physical therapy,  Start neurontin 300 mg twice a day  Update labs today  Refill atorvastatin.   Start lisinopril 10 mg for blood pressure

## 2019-06-05 NOTE — PROGRESS NOTES
Coordination of Care  1. Have you been to the ER, urgent care clinic since your last visit? Hospitalized since your last visit? No    2. Have you seen or consulted any other health care providers outside of the [address] [place] [address] since your last visit? Include any pap smears or colon screening. No    Does the patient need refills? YES    Learning Assessment Complete?  yes  Depression Screening complete in the past 12 months? yes

## 2019-06-05 NOTE — PROGRESS NOTES
Avs discussed with Ervinallie Elroy by Discharge Nurse Kalpesh Martin LPN. Discussed medications prescribed today, good rx coupon printed and given to pt. Explained to pt's daughter to walkin for xray, and that she will need to apply for the care card. Will make an appt for AN renewal.  Patient verbalized understanding and has no further questions.  AVS printed and given to patient Kalpesh Martin LPN

## 2019-06-06 LAB
CREAT UR-MCNC: 88.3 MG/DL
EST. AVERAGE GLUCOSE BLD GHB EST-MCNC: 134 MG/DL
HBA1C MFR BLD: 6.3 % (ref 4.2–6.3)
MICROALBUMIN UR-MCNC: 1.4 MG/DL
MICROALBUMIN/CREAT UR-RTO: 16 MG/G (ref 0–30)

## 2019-06-06 NOTE — PROGRESS NOTES
Patient shows signs of arthritis in lumbar spine.   Proceed with physical therapy referral  Patient can be informed

## 2019-06-06 NOTE — PROGRESS NOTES
Normal kidney function, normal liver function test normal electrolytes  Her hemoglobin a1c is 6.3% which means is at target, no need to add medication  Cholesterol is elevated,   Needs to be taking atorvastatin 40 mg once  A day  Patient can be informed

## 2019-06-19 ENCOUNTER — OFFICE VISIT (OUTPATIENT)
Dept: FAMILY MEDICINE CLINIC | Age: 59
End: 2019-06-19

## 2019-06-19 DIAGNOSIS — Z71.89 COUNSELING AND COORDINATION OF CARE: Primary | ICD-10-CM

## 2019-08-08 ENCOUNTER — TELEPHONE (OUTPATIENT)
Dept: FAMILY MEDICINE CLINIC | Age: 59
End: 2019-08-08

## 2019-08-08 NOTE — TELEPHONE ENCOUNTER
Someone from [facility] arms left a VM wanted to talk to a nurse about this patient  They would like to be contacted [phone]    Thank you

## 2019-08-08 NOTE — TELEPHONE ENCOUNTER
Order received and a new aquatic therapy order is needed to be signed for this pt. Informed fax received and will be placed in area for medical staff to review. May take 3-5 days until can be returned signed. Routing to provider Sherif Jurado for review.

## 2019-08-21 ENCOUNTER — OFFICE VISIT (OUTPATIENT)
Dept: FAMILY MEDICINE CLINIC | Age: 59
End: 2019-08-21

## 2019-08-21 VITALS
BODY MASS INDEX: 44.22 KG/M2 | HEART RATE: 69 BPM | TEMPERATURE: 98.2 F | DIASTOLIC BLOOD PRESSURE: 70 MMHG | SYSTOLIC BLOOD PRESSURE: 103 MMHG | WEIGHT: 259 LBS

## 2019-08-21 DIAGNOSIS — M54.42 CHRONIC LEFT-SIDED LOW BACK PAIN WITH LEFT-SIDED SCIATICA: ICD-10-CM

## 2019-08-21 DIAGNOSIS — I10 ESSENTIAL HYPERTENSION: ICD-10-CM

## 2019-08-21 DIAGNOSIS — E78.2 MIXED HYPERLIPIDEMIA: ICD-10-CM

## 2019-08-21 DIAGNOSIS — E11.9 CONTROLLED TYPE 2 DIABETES MELLITUS WITHOUT COMPLICATION, WITHOUT LONG-TERM CURRENT USE OF INSULIN (HCC): Primary | ICD-10-CM

## 2019-08-21 DIAGNOSIS — E78.5 HYPERLIPIDEMIA, UNSPECIFIED HYPERLIPIDEMIA TYPE: ICD-10-CM

## 2019-08-21 DIAGNOSIS — G89.29 CHRONIC LEFT-SIDED LOW BACK PAIN WITH LEFT-SIDED SCIATICA: ICD-10-CM

## 2019-08-21 LAB — GLUCOSE POC: NORMAL MG/DL

## 2019-08-21 NOTE — TELEPHONE ENCOUNTER
Patient was referred to Physical Therapy through [facility]. After seeing the patient, they determined that Aquatic therapy may be beneficial and requested a signed prescription. The request they faxed was in my mailbox. Blanca Sepulveda signed it on 08-14-19 and it was faxed to [facility] who faxed it to [address] [place] [address].  Serg Jurado RN

## 2019-08-21 NOTE — PROGRESS NOTES
Results for orders placed or performed in visit on 08/21/19   AMB POC GLUCOSE BLOOD, BY GLUCOSE MONITORING DEVICE   Result Value Ref Range    Glucose POC fasting 94 mg/dL

## 2019-08-21 NOTE — PROGRESS NOTES
Patient came to the lab after her appointment with Dr. Ricardo Kemp for a Lipid. After 2 unsuccessful sticks, I advised patient that she needed to drink plenty of water and go to the hopsital to get her labs drawn. She refused. I spoke with Humera Sheth, and she advised that she patient drink plenty of water and can come to [facility] early tomorrow morning, before 9am, for a walk in lab. Patient accepted.

## 2019-08-21 NOTE — PROGRESS NOTES
Subjective:     Chief Complaint   Patient presents with    Follow-up     back pain     she is a 62y.o. year old female who presents for evalution. F/u very high cholesterol, and left sciatica. Is still in therapy, does not want to take pain meds. Just started Aq Th, thinks she's doing better but is still too uncomfotable to walk for exercise. Last A1C 6.3. Taking htn and lipid meds every day now. Past Medical History:   Diagnosis Date    Essential hypertension     Hyperlipidemia     [facility]     Type 2 diabetes mellitus ([facility] dx 1/2016    with a1c 6.7%       Objective:   c  Vitals:    08/21/19 1022   BP: 103/70   Pulse: 69   Temp: 98.2 °F (36.8 °C)   TempSrc: Oral   Weight: 259 lb (117.5 kg)       Physical Examination: General appearance - alert, well appearing, and in no distress and overweight  Neck - supple, no significant adenopathy, thyroid exam: thyroid is normal in size without nodules or tenderness  Chest - clear to auscultation, no wheezes, rales or rhonchi, symmetric air entry  Heart - normal rate, regular rhythm, normal S1, S2, no murmurs, rubs, clicks or gallops  Back exam - tender left lumbar region and +SLR. No weight change since last visit but wt significantly higher than last year. Assessment/ Plan:   1. Sciatica, doing a little better. Continue therapy, use the diet she got from [facility] Last year. 2.  Hyperlipidemia, recheck today. 3.  Htn, good control. Results for orders placed or performed in visit on 08/21/19   AMB POC GLUCOSE BLOOD, BY GLUCOSE MONITORING DEVICE   Result Value Ref Range    Glucose POC fasting 94 mg/dL       Orders Placed This Encounter    LIPID PANEL     Standing Status:   Future     Standing Expiration Date:   2/21/2020    AMB POC GLUCOSE BLOOD, BY GLUCOSE MONITORING DEVICE           Follow-up and Dispositions    · Return in about 3 months (around 11/21/2019).

## 2019-08-22 ENCOUNTER — LAB ONLY (OUTPATIENT)
Dept: FAMILY MEDICINE CLINIC | Age: 59
End: 2019-08-22

## 2019-08-22 ENCOUNTER — HOSPITAL ENCOUNTER (OUTPATIENT)
Dept: LAB | Age: 59
Discharge: HOME | End: 2019-08-22

## 2019-08-22 DIAGNOSIS — E78.5 HYPERLIPIDEMIA, UNSPECIFIED HYPERLIPIDEMIA TYPE: ICD-10-CM

## 2019-08-22 LAB
CHOLEST SERPL-MCNC: 184 MG/DL
HDLC SERPL-MCNC: 62 MG/DL
HDLC SERPL: 3 {RATIO} (ref 0–5)
LDLC SERPL CALC-MCNC: 103.8 MG/DL (ref 0–100)
LIPID PROFILE,FLP: ABNORMAL
TRIGL SERPL-MCNC: 91 MG/DL (ref ?–150)
VLDLC SERPL CALC-MCNC: 18.2 MG/DL

## 2019-08-22 PROCEDURE — 80061 LIPID PANEL: CPT

## 2019-08-27 NOTE — PROGRESS NOTES
Spoke with the pt and her daughter regarding excellent cholesterol. Discussed pap appt 9/4 and [facility] f/u 12/4. Has plenty of meds.

## 2019-09-04 ENCOUNTER — OFFICE VISIT (OUTPATIENT)
Dept: FAMILY PLANNING/WOMEN'S HEALTH CLINIC | Age: 59
End: 2019-09-04

## 2019-09-04 ENCOUNTER — HOSPITAL ENCOUNTER (OUTPATIENT)
Dept: LAB | Age: 59
Discharge: HOME | End: 2019-09-04

## 2019-09-04 ENCOUNTER — HOSPITAL ENCOUNTER (OUTPATIENT)
Dept: MAMMOGRAPHY | Age: 59
Discharge: HOME | End: 2019-09-04

## 2019-09-04 VITALS — DIASTOLIC BLOOD PRESSURE: 77 MMHG | SYSTOLIC BLOOD PRESSURE: 125 MMHG

## 2019-09-04 DIAGNOSIS — Z12.31 VISIT FOR SCREENING MAMMOGRAM: ICD-10-CM

## 2019-09-04 DIAGNOSIS — Z01.419 WELL WOMAN EXAM WITH ROUTINE GYNECOLOGICAL EXAM: Primary | ICD-10-CM

## 2019-09-04 DIAGNOSIS — Z12.11 ENCOUNTER FOR SCREENING FECAL OCCULT BLOOD TESTING: ICD-10-CM

## 2019-09-04 PROCEDURE — 77067 SCR MAMMO BI INCL CAD: CPT

## 2019-09-04 PROCEDURE — 88175 CYTOPATH C/V AUTO FLUID REDO: CPT

## 2019-09-04 NOTE — PROGRESS NOTES
Assessment/Plan:    Diagnoses and all orders for this visit:    1. Well woman exam with routine gynecological exam  -     PAP IG, RFX APTIMA HPV ASCUS ([id number]))    2. Encounter for screening fecal occult blood testing            JAYLENE Chávez expressed understanding of this plan. An AVS was printed and given to the patient.      ----------------------------------------------------------------------    Chief Complaint   Patient presents with    Well Woman     [facility] visit       History of Present Illness:  Pt presents for annual well woman exam  States no change in her gyn health  Not sexually active  Due for pap today  Has never had a colonoscopy        Past Medical History:   Diagnosis Date    Essential hypertension     Hyperlipidemia     [facility]bid [facility] ([facility]     Type 2 diabetes mellitus ([facility] dx 1/2016    with a1c 6.7%       Current Outpatient Medications   Medication Sig Dispense Refill    lisinopril (PRINIVIL, ZESTRIL) 10 mg tablet Take 1 Tab by mouth daily. 90 Tab 3    atorvastatin (LIPITOR) 40 mg tablet Take 1 Tab by mouth daily. 90 Tab 3       Allergies   Allergen Reactions    Ibuprofen Nausea Only       Social History     Tobacco Use    Smoking status: Never Smoker    Smokeless tobacco: Never Used   Substance Use Topics    Alcohol use: No     Alcohol/week: 0.0 standard drinks    Drug use: No       No family history on file. Physical Exam:     Visit Vitals  /77       A&Ox3  WDWN NAD  Respirations normal and non labored  Breast exam- anastacio neg for mass or tenderness  Pelvic exam- ext neg for lesion or discharge. Cervix and vagina w/out lesions. Grayish discharge malodorous present.  Not bothersome to pt so not treated likely BV  Bimanual neg for mass or tenderness  Rectal- heme guaiac stool testing neg

## 2019-09-04 NOTE — PROGRESS NOTES
EVERY WOMANS LIFE HISTORY QUESTIONNAIRE       No Yes Comments   Has a doctor ever seen or felt anything wrong with your breast? [x]                                  []                                     Have you ever had a breast biopsy? [x]                                  []                                          When and where was last mammogram performed? 7/2018 with [facility]    Have you ever been told that there was a problem on your mammogram?   No Yes Comments   [x]                                  []                                       Do you have breast implants? No Yes Comments   [x]                                  []                                       When was your last Pap test performed? 2/2016 at [facility] you ever had an abnormal Pap test?   No Yes Comments   [x]                                  []                                       Have you had a hysterectomy? No Yes Comments (why)   [x]                                  []                                       Have you been through menopause? No Yes Date of LMP   []                                  [x]                                  About 10 yrs ago     Did your mother take JUVENAL? No Yes Unknown   []                                  []                                  X     Do you have a history of HIV exposure? No Yes    [x]                                  []                                       Have you ever been diagnosed with any type of Cancer   No Yes Comments (type,when,where,type of treatment   [x]                                  []                                          Has a family member been diagnosed with breast or ovarian cancer?    No Yes Comments (which family members, and type   [x]                                  []                                       Are you taking hormone replacement therapy (HRT)     No Yes Comments   [x]                                  []                                       How many times have you been pregnant? 5        Number of live births ? 5    Are you experiencing any of the following? No Yes Comments   Nipple Discharge [x]                                  []                                     Breast Lump/Masses [x]                                  []                                     Breast Skin Changes [x]                                  []                                          No Yes Comments   Vaginal Discharge [x]                                  []                                     Abnormal/unusual vaginal bleeding [x]                                  []                                         Are you experiencing any other health problems? HTN, high chol----goes to the [facility]        Age at first period?   13  Age at first birth?  23    Ht--5' 4\"    Wt--249 #

## 2019-12-04 ENCOUNTER — OFFICE VISIT (OUTPATIENT)
Dept: FAMILY MEDICINE CLINIC | Age: 59
End: 2019-12-04

## 2019-12-04 ENCOUNTER — HOSPITAL ENCOUNTER (OUTPATIENT)
Dept: LAB | Age: 59
Discharge: HOME | End: 2019-12-04

## 2019-12-04 VITALS
SYSTOLIC BLOOD PRESSURE: 132 MMHG | BODY MASS INDEX: 43.87 KG/M2 | WEIGHT: 257 LBS | TEMPERATURE: 97.9 F | HEIGHT: 64 IN | DIASTOLIC BLOOD PRESSURE: 72 MMHG | HEART RATE: 61 BPM

## 2019-12-04 DIAGNOSIS — H60.93 OTITIS EXTERNA OF BOTH EARS, UNSPECIFIED CHRONICITY, UNSPECIFIED TYPE: Primary | ICD-10-CM

## 2019-12-04 DIAGNOSIS — I10 ESSENTIAL HYPERTENSION: ICD-10-CM

## 2019-12-04 DIAGNOSIS — E78.2 MIXED HYPERLIPIDEMIA: ICD-10-CM

## 2019-12-04 DIAGNOSIS — E11.9 CONTROLLED TYPE 2 DIABETES MELLITUS WITHOUT COMPLICATION, WITHOUT LONG-TERM CURRENT USE OF INSULIN (HCC): ICD-10-CM

## 2019-12-04 DIAGNOSIS — E11.9 DIABETES MELLITUS TYPE 2, DIET-CONTROLLED (HCC): ICD-10-CM

## 2019-12-04 DIAGNOSIS — Z71.89 COUNSELING AND COORDINATION OF CARE: Primary | ICD-10-CM

## 2019-12-04 DIAGNOSIS — E78.5 HYPERLIPIDEMIA, UNSPECIFIED HYPERLIPIDEMIA TYPE: ICD-10-CM

## 2019-12-04 DIAGNOSIS — Z23 ENCOUNTER FOR IMMUNIZATION: ICD-10-CM

## 2019-12-04 LAB
ALBUMIN SERPL-MCNC: 3.9 G/DL (ref 3.5–5)
ALBUMIN/GLOB SERPL: 1.1 {RATIO} (ref 1.1–2.2)
ALP SERPL-CCNC: 94 U/L (ref 45–117)
ALT SERPL-CCNC: 21 U/L (ref 12–78)
ANION GAP SERPL CALC-SCNC: 7 MMOL/L (ref 5–15)
AST SERPL-CCNC: 14 U/L (ref 15–37)
BASOPHILS # BLD: 0 K/UL (ref 0–0.1)
BASOPHILS NFR BLD: 1 % (ref 0–1)
BILIRUB SERPL-MCNC: 0.6 MG/DL (ref 0.2–1)
BUN SERPL-MCNC: 12 MG/DL (ref 6–20)
BUN/CREAT SERPL: 14 (ref 12–20)
CALCIUM SERPL-MCNC: 9.1 MG/DL (ref 8.5–10.1)
CHLORIDE SERPL-SCNC: 104 MMOL/L (ref 97–108)
CHOLEST SERPL-MCNC: 238 MG/DL
CO2 SERPL-SCNC: 29 MMOL/L (ref 21–32)
CREAT SERPL-MCNC: 0.84 MG/DL (ref 0.55–1.02)
CREAT UR-MCNC: 93.6 MG/DL
DIFFERENTIAL METHOD BLD: NORMAL
EOSINOPHIL # BLD: 0 K/UL (ref 0–0.4)
EOSINOPHIL NFR BLD: 1 % (ref 0–7)
ERYTHROCYTE [DISTWIDTH] IN BLOOD BY AUTOMATED COUNT: 14.2 % (ref 11.5–14.5)
EST. AVERAGE GLUCOSE BLD GHB EST-MCNC: 143 MG/DL
GLOBULIN SER CALC-MCNC: 3.6 G/DL (ref 2–4)
GLUCOSE POC: NORMAL MG/DL
GLUCOSE SERPL-MCNC: 110 MG/DL (ref 65–100)
HBA1C MFR BLD: 6.6 % (ref 4–5.6)
HCT VFR BLD AUTO: 42.5 % (ref 35–47)
HDLC SERPL-MCNC: 60 MG/DL
HDLC SERPL: 4 {RATIO} (ref 0–5)
HGB BLD-MCNC: 13.1 G/DL (ref 11.5–16)
IMM GRANULOCYTES # BLD AUTO: 0 K/UL (ref 0–0.04)
IMM GRANULOCYTES NFR BLD AUTO: 0 % (ref 0–0.5)
LDLC SERPL CALC-MCNC: 161 MG/DL (ref 0–100)
LIPID PROFILE,FLP: ABNORMAL
LYMPHOCYTES # BLD: 1.6 K/UL (ref 0.8–3.5)
LYMPHOCYTES NFR BLD: 36 % (ref 12–49)
MCH RBC QN AUTO: 26.2 PG (ref 26–34)
MCHC RBC AUTO-ENTMCNC: 30.8 G/DL (ref 30–36.5)
MCV RBC AUTO: 85 FL (ref 80–99)
MICROALBUMIN UR-MCNC: <0.5 MG/DL
MICROALBUMIN/CREAT UR-RTO: NORMAL MG/G (ref 0–30)
MONOCYTES # BLD: 0.6 K/UL (ref 0–1)
MONOCYTES NFR BLD: 13 % (ref 5–13)
NEUTS SEG # BLD: 2.1 K/UL (ref 1.8–8)
NEUTS SEG NFR BLD: 49 % (ref 32–75)
NRBC # BLD: 0 K/UL (ref 0–0.01)
NRBC BLD-RTO: 0 PER 100 WBC
PLATELET # BLD AUTO: 232 K/UL (ref 150–400)
PMV BLD AUTO: 10.2 FL (ref 8.9–12.9)
POTASSIUM SERPL-SCNC: 4.6 MMOL/L (ref 3.5–5.1)
PROT SERPL-MCNC: 7.5 G/DL (ref 6.4–8.2)
RBC # BLD AUTO: 5 M/UL (ref 3.8–5.2)
SODIUM SERPL-SCNC: 140 MMOL/L (ref 136–145)
TRIGL SERPL-MCNC: 85 MG/DL (ref ?–150)
VLDLC SERPL CALC-MCNC: 17 MG/DL
WBC # BLD AUTO: 4.3 K/UL (ref 3.6–11)

## 2019-12-04 PROCEDURE — 80053 COMPREHEN METABOLIC PANEL: CPT

## 2019-12-04 PROCEDURE — 85025 COMPLETE CBC W/AUTO DIFF WBC: CPT

## 2019-12-04 PROCEDURE — 80061 LIPID PANEL: CPT

## 2019-12-04 PROCEDURE — 36415 COLL VENOUS BLD VENIPUNCTURE: CPT

## 2019-12-04 PROCEDURE — 82043 UR ALBUMIN QUANTITATIVE: CPT

## 2019-12-04 PROCEDURE — 83036 HEMOGLOBIN GLYCOSYLATED A1C: CPT

## 2019-12-04 RX ORDER — LOSARTAN POTASSIUM 50 MG/1
50 TABLET ORAL DAILY
Qty: 90 TAB | Refills: 3 | Status: SHIPPED | OUTPATIENT
Start: 2019-12-04

## 2019-12-04 RX ORDER — OFLOXACIN 3 MG/ML
5 SOLUTION AURICULAR (OTIC) DAILY
Qty: 5 ML | Refills: 0 | Status: SHIPPED | OUTPATIENT
Start: 2019-12-04

## 2019-12-04 RX ORDER — PRAVASTATIN SODIUM 40 MG/1
40 TABLET ORAL
Qty: 90 TAB | Refills: 3 | Status: SHIPPED | OUTPATIENT
Start: 2019-12-04 | End: 2020-04-06

## 2019-12-04 NOTE — PROGRESS NOTES
HISTORY OF PRESENT ILLNESS  Chris Del Valle is a 62 y.o. female. HPI  Patient states she is doing well. She finished therapy and improved. She is taking blood pressure medication and cholesterol medication. She has remained in prediabetes range in terms of sugar level. Patient states she has had joint aches with the cholesterol medication and lately has been presenting dry cough with the lisinopril  Review of Systems   Constitutional: Negative for chills, fever and weight loss. HENT: Negative for ear pain, hearing loss and tinnitus. Respiratory: Positive for cough. Negative for hemoptysis and sputum production. Cardiovascular: Negative for chest pain, palpitations and orthopnea. Gastrointestinal: Negative for heartburn, nausea and vomiting. Genitourinary: Negative for dysuria, frequency, hematuria and urgency. Musculoskeletal: Positive for myalgias. Negative for back pain. Skin: Negative for itching and rash. Neurological: Negative for dizziness, tingling, tremors and headaches. Psychiatric/Behavioral: Negative for depression, hallucinations, substance abuse and suicidal ideas. /72 (BP 1 Location: Left arm, BP Patient Position: Sitting)   Pulse 61   Temp 97.9 °F (36.6 °C) (Oral)   Ht 5' 4.17\" (1.63 m)   Wt 257 lb (116.6 kg)   BMI 43.88 kg/m²   Physical Exam  Constitutional:       Appearance: She is well-developed. HENT:      Head: Normocephalic. Right Ear: Tympanic membrane and external ear normal.      Left Ear: Tympanic membrane and external ear normal.      Ears:      Comments: Erythematous external ear canal, tender to exam     Mouth/Throat:      Pharynx: No oropharyngeal exudate. Eyes:      Conjunctiva/sclera: Conjunctivae normal.      Pupils: Pupils are equal, round, and reactive to light. Neck:      Musculoskeletal: Normal range of motion and neck supple. Thyroid: No thyromegaly. Trachea: No tracheal deviation.    Cardiovascular:      Rate and Rhythm: Normal rate and regular rhythm. Heart sounds: Normal heart sounds. No murmur. Pulmonary:      Effort: Pulmonary effort is normal. No respiratory distress. Breath sounds: Normal breath sounds. No wheezing or rales. Abdominal:      General: There is no distension. Palpations: Abdomen is soft. Tenderness: There is no tenderness. There is no rebound. Musculoskeletal: Normal range of motion. General: No tenderness. Skin:     General: Skin is warm. Findings: No erythema. Neurological:      Mental Status: She is alert. ASSESSMENT and PLAN  Diagnoses and all orders for this visit:    1. Otitis externa of both ears, unspecified chronicity, unspecified type  -     ofloxacin (FLOXIN) 0.3 % otic solution; Administer 5 Drops into each ear daily. 2. Hyperlipidemia, unspecified hyperlipidemia type  -     pravastatin (PRAVACHOL) 40 mg tablet; Take 1 Tab by mouth nightly. 3. Diabetes mellitus type 2, diet-controlled (HCC)  -     AMB POC GLUCOSE BLOOD, BY GLUCOSE MONITORING DEVICE  -     REFERRAL TO OPHTHALMOLOGY  -     LIPID PANEL; Future  -     METABOLIC PANEL, COMPREHENSIVE; Future  -     CBC WITH AUTOMATED DIFF; Future  -     HEMOGLOBIN A1C WITH EAG; Future  -     MICROALBUMIN, UR, RAND W/ MICROALB/CREAT RATIO; Future    4. Essential hypertension  -     losartan (COZAAR) 50 mg tablet; Take 1 Tab by mouth daily. Otitis externa, will write for ofloxacin  Dry cough with lisinopril, will change to losartan  Check labs today  States atorvastatin is causing muscle pains, will change to pravachol.   Refer to [facility] for [facility] exam

## 2019-12-04 NOTE — PROGRESS NOTES
Coordination of Care  1. Have you been to the ER, urgent care clinic since your last visit? Hospitalized since your last visit? No    2. Have you seen or consulted any other health care providers outside of the [address] [place] [address] since your last visit? Include any pap smears or colon screening. No    Does the patient need refills? NO    Learning Assessment Complete?  yes  Depression Screening complete in the past 12 months? yes  Results for orders placed or performed in visit on 12/04/19   AMB POC GLUCOSE BLOOD, BY GLUCOSE MONITORING DEVICE   Result Value Ref Range    Glucose POC fasting 96 mg/dL

## 2019-12-04 NOTE — PROGRESS NOTES
Met with patient to let her know what financial information she needs to bring in order to apply for [facility]. Patient has been scheduled for 12/5/19.

## 2019-12-05 ENCOUNTER — OFFICE VISIT (OUTPATIENT)
Dept: FAMILY MEDICINE CLINIC | Age: 59
End: 2019-12-05

## 2019-12-05 DIAGNOSIS — Z71.89 COUNSELING AND COORDINATION OF CARE: Primary | ICD-10-CM

## 2019-12-05 NOTE — PROGRESS NOTES
Type 2 diabetes is still diet controlled hemoglobin a1c is 6.6%.   Normal  Blood count, kidney function, liver function and electrolytes  Cholesterol levels are a little elevated, needs to take the pravastatin prescribed  Patient can be informed

## 2019-12-05 NOTE — PROGRESS NOTES
[facility] financial screening application was completed today and submitted to [facility].

## 2020-04-06 ENCOUNTER — TELEPHONE (OUTPATIENT)
Dept: FAMILY MEDICINE CLINIC | Age: 60
End: 2020-04-06

## 2020-04-06 RX ORDER — ATORVASTATIN CALCIUM 40 MG/1
40 TABLET, FILM COATED ORAL DAILY
Qty: 90 TAB | Refills: 3 | Status: SHIPPED | OUTPATIENT
Start: 2020-04-06

## 2020-04-06 NOTE — TELEPHONE ENCOUNTER
Tc to the pt's Dtr Reagan Coleman on the [facility]. She was told the provider sent in a new medication for substitute for the pravastatin. It was Atorvastatin 40 mg, #90 tablets. The pt's Dtr was texted a goodrx coupon. Ms Trista Hinkle verbalized understanding.  Jerica Mendoza RN

## 2020-04-06 NOTE — TELEPHONE ENCOUNTER
Tc to the pt's Daughter. Samantha Luciano, who is on the pt's PHI. She stated the Pharmacy called her and told them to contact the Dr and ask for a substitute for the medication Pravastatin. The Pharmacy stated they were not able to get this medication at this time. Routed this message to the provider. The pt's Dtr was told the message was sent to the Dr and to check back with her Pharmacy either tonight or tomorrow and see if the medication is there for her mother. If the medication was not received at the Pharmacy the pt's Dtr was told to contact this nurse tomorrow.  Miki Bran RN

## 2020-04-06 NOTE — TELEPHONE ENCOUNTER
GENIA from Shaun Nova, whose name is on the [address] [place] [address], and is the daughter of patient, Tova Lucio, said one of her medications is not available and asked for a substitute. I'm sorry but I could not catch the name of the medicine.      Brisa Pope

## 2022-03-19 PROBLEM — H26.9 CATARACT: Status: ACTIVE | Noted: 2018-08-09

## 2023-05-22 RX ORDER — LOSARTAN POTASSIUM 50 MG/1
50 TABLET ORAL DAILY
COMMUNITY
Start: 2019-12-04

## 2023-05-22 RX ORDER — OFLOXACIN 3 MG/ML
5 SOLUTION AURICULAR (OTIC) DAILY
COMMUNITY
Start: 2019-12-04

## 2023-05-22 RX ORDER — ATORVASTATIN CALCIUM 40 MG/1
40 TABLET, FILM COATED ORAL DAILY
COMMUNITY
Start: 2020-04-06

## 2025-06-04 ENCOUNTER — OFFICE VISIT (OUTPATIENT)
Facility: CLINIC | Age: 65
End: 2025-06-04
Payer: COMMERCIAL

## 2025-06-04 VITALS
RESPIRATION RATE: 16 BRPM | TEMPERATURE: 97.8 F | HEIGHT: 64 IN | WEIGHT: 269.2 LBS | OXYGEN SATURATION: 100 % | BODY MASS INDEX: 45.96 KG/M2 | HEART RATE: 59 BPM | DIASTOLIC BLOOD PRESSURE: 61 MMHG | SYSTOLIC BLOOD PRESSURE: 136 MMHG

## 2025-06-04 DIAGNOSIS — E11.9 CONTROLLED TYPE 2 DIABETES MELLITUS WITHOUT COMPLICATION, WITHOUT LONG-TERM CURRENT USE OF INSULIN (HCC): Primary | ICD-10-CM

## 2025-06-04 DIAGNOSIS — M17.0 PRIMARY OSTEOARTHRITIS OF BOTH KNEES: ICD-10-CM

## 2025-06-04 DIAGNOSIS — E78.5 HYPERLIPIDEMIA, UNSPECIFIED HYPERLIPIDEMIA TYPE: ICD-10-CM

## 2025-06-04 DIAGNOSIS — B35.1 ONYCHOMYCOSIS: ICD-10-CM

## 2025-06-04 DIAGNOSIS — Z00.00 PHYSICAL EXAM: ICD-10-CM

## 2025-06-04 DIAGNOSIS — Z12.31 SCREENING MAMMOGRAM FOR BREAST CANCER: ICD-10-CM

## 2025-06-04 DIAGNOSIS — E66.01 MORBID OBESITY (HCC): ICD-10-CM

## 2025-06-04 DIAGNOSIS — I10 ESSENTIAL HYPERTENSION: ICD-10-CM

## 2025-06-04 PROCEDURE — 3078F DIAST BP <80 MM HG: CPT | Performed by: INTERNAL MEDICINE

## 2025-06-04 PROCEDURE — 3075F SYST BP GE 130 - 139MM HG: CPT | Performed by: INTERNAL MEDICINE

## 2025-06-04 PROCEDURE — 99204 OFFICE O/P NEW MOD 45 MIN: CPT | Performed by: INTERNAL MEDICINE

## 2025-06-04 PROCEDURE — 99387 INIT PM E/M NEW PAT 65+ YRS: CPT | Performed by: INTERNAL MEDICINE

## 2025-06-04 SDOH — ECONOMIC STABILITY: FOOD INSECURITY: WITHIN THE PAST 12 MONTHS, YOU WORRIED THAT YOUR FOOD WOULD RUN OUT BEFORE YOU GOT MONEY TO BUY MORE.: NEVER TRUE

## 2025-06-04 SDOH — ECONOMIC STABILITY: FOOD INSECURITY: WITHIN THE PAST 12 MONTHS, THE FOOD YOU BOUGHT JUST DIDN'T LAST AND YOU DIDN'T HAVE MONEY TO GET MORE.: NEVER TRUE

## 2025-06-04 ASSESSMENT — PATIENT HEALTH QUESTIONNAIRE - PHQ9
SUM OF ALL RESPONSES TO PHQ QUESTIONS 1-9: 0
2. FEELING DOWN, DEPRESSED OR HOPELESS: NOT AT ALL
1. LITTLE INTEREST OR PLEASURE IN DOING THINGS: NOT AT ALL
SUM OF ALL RESPONSES TO PHQ QUESTIONS 1-9: 0

## 2025-06-04 NOTE — PROGRESS NOTES
Chief Complaint   Patient presents with    New Patient     Establishing care     Patient states she wishes to establish care.  Patient states she has been having a lot of pain in the abdomin and legs and is unable to sleep at night due to the constant pain.    Have you been to the ER, urgent care clinic since your last visit?  Hospitalized since your last visit?   NO    Have you seen or consulted any other health care providers outside our system since your last visit?   NO    Have you had a mammogram?   NO    Date of last Mammogram: 9/4/2019       Have you had a colorectal cancer screening such as a colonoscopy/FIT/Cologuard?    NO    No colonoscopy on file  No cologuard on file  No FIT/FOBT on file   No flexible sigmoidoscopy on file     Have you had a diabetic eye exam?   NO     Date of last diabetic eye exam: 12/12/2019

## 2025-06-05 LAB
ALBUMIN SERPL-MCNC: 3.8 G/DL (ref 3.5–5)
ALBUMIN/GLOB SERPL: 0.9 (ref 1.1–2.2)
ALP SERPL-CCNC: 95 U/L (ref 45–117)
ALT SERPL-CCNC: 22 U/L (ref 12–78)
ANION GAP SERPL CALC-SCNC: 7 MMOL/L (ref 2–12)
APPEARANCE UR: ABNORMAL
AST SERPL-CCNC: 20 U/L (ref 15–37)
BACTERIA URNS QL MICRO: ABNORMAL /HPF
BASOPHILS # BLD: 0.02 K/UL (ref 0–0.1)
BASOPHILS NFR BLD: 0.4 % (ref 0–1)
BILIRUB SERPL-MCNC: 0.4 MG/DL (ref 0.2–1)
BILIRUB UR QL: NEGATIVE
BUN SERPL-MCNC: 12 MG/DL (ref 6–20)
BUN/CREAT SERPL: 15 (ref 12–20)
CALCIUM SERPL-MCNC: 9.7 MG/DL (ref 8.5–10.1)
CHLORIDE SERPL-SCNC: 107 MMOL/L (ref 97–108)
CHOLEST SERPL-MCNC: 282 MG/DL
CO2 SERPL-SCNC: 27 MMOL/L (ref 21–32)
COLOR UR: ABNORMAL
CREAT SERPL-MCNC: 0.8 MG/DL (ref 0.55–1.02)
CREAT UR-MCNC: 203 MG/DL
CRP SERPL HS-MCNC: >9.5 MG/L
DIFFERENTIAL METHOD BLD: ABNORMAL
EOSINOPHIL # BLD: 0.06 K/UL (ref 0–0.4)
EOSINOPHIL NFR BLD: 1.1 % (ref 0–7)
EPITH CASTS URNS QL MICRO: ABNORMAL /LPF
ERYTHROCYTE [DISTWIDTH] IN BLOOD BY AUTOMATED COUNT: 14.1 % (ref 11.5–14.5)
EST. AVERAGE GLUCOSE BLD GHB EST-MCNC: 223 MG/DL
GLOBULIN SER CALC-MCNC: 4.1 G/DL (ref 2–4)
GLUCOSE SERPL-MCNC: 160 MG/DL (ref 65–100)
GLUCOSE UR STRIP.AUTO-MCNC: NEGATIVE MG/DL
HBA1C MFR BLD: 9.4 % (ref 4–5.6)
HCT VFR BLD AUTO: 42.1 % (ref 35–47)
HDLC SERPL-MCNC: 76 MG/DL
HDLC SERPL: 3.7 (ref 0–5)
HGB BLD-MCNC: 13.2 G/DL (ref 11.5–16)
HGB UR QL STRIP: NEGATIVE
HYALINE CASTS URNS QL MICRO: ABNORMAL /LPF (ref 0–5)
IMM GRANULOCYTES # BLD AUTO: 0.01 K/UL (ref 0–0.04)
IMM GRANULOCYTES NFR BLD AUTO: 0.2 % (ref 0–0.5)
KETONES UR QL STRIP.AUTO: NEGATIVE MG/DL
LDLC SERPL CALC-MCNC: 183 MG/DL (ref 0–100)
LEUKOCYTE ESTERASE UR QL STRIP.AUTO: NEGATIVE
LYMPHOCYTES # BLD: 2.23 K/UL (ref 0.8–3.5)
LYMPHOCYTES NFR BLD: 41.4 % (ref 12–49)
MCH RBC QN AUTO: 25.8 PG (ref 26–34)
MCHC RBC AUTO-ENTMCNC: 31.4 G/DL (ref 30–36.5)
MCV RBC AUTO: 82.4 FL (ref 80–99)
MICROALBUMIN UR-MCNC: 27.1 MG/DL
MICROALBUMIN/CREAT UR-RTO: 133 MG/G (ref 0–30)
MONOCYTES # BLD: 0.53 K/UL (ref 0–1)
MONOCYTES NFR BLD: 9.8 % (ref 5–13)
NEUTS SEG # BLD: 2.54 K/UL (ref 1.8–8)
NEUTS SEG NFR BLD: 47.1 % (ref 32–75)
NITRITE UR QL STRIP.AUTO: NEGATIVE
NRBC # BLD: 0 K/UL (ref 0–0.01)
NRBC BLD-RTO: 0 PER 100 WBC
PH UR STRIP: 5.5 (ref 5–8)
PLATELET # BLD AUTO: 274 K/UL (ref 150–400)
PMV BLD AUTO: 11.1 FL (ref 8.9–12.9)
POTASSIUM SERPL-SCNC: 4.7 MMOL/L (ref 3.5–5.1)
PROT SERPL-MCNC: 7.9 G/DL (ref 6.4–8.2)
PROT UR STRIP-MCNC: 30 MG/DL
RBC # BLD AUTO: 5.11 M/UL (ref 3.8–5.2)
RBC #/AREA URNS HPF: ABNORMAL /HPF (ref 0–5)
SODIUM SERPL-SCNC: 141 MMOL/L (ref 136–145)
SP GR UR REFRACTOMETRY: 1.02 (ref 1–1.03)
TRIGL SERPL-MCNC: 115 MG/DL
TSH SERPL DL<=0.05 MIU/L-ACNC: 1.66 UIU/ML (ref 0.36–3.74)
UROBILINOGEN UR QL STRIP.AUTO: 0.2 EU/DL (ref 0.2–1)
VLDLC SERPL CALC-MCNC: 23 MG/DL
WBC # BLD AUTO: 5.4 K/UL (ref 3.6–11)
WBC URNS QL MICRO: ABNORMAL /HPF (ref 0–4)

## 2025-06-06 LAB — APO B SERPL-MCNC: 150 MG/DL

## 2025-06-08 PROBLEM — B35.1 ONYCHOMYCOSIS: Status: ACTIVE | Noted: 2025-06-08

## 2025-06-09 DIAGNOSIS — I10 ESSENTIAL HYPERTENSION: ICD-10-CM

## 2025-06-09 DIAGNOSIS — E11.65 UNCONTROLLED TYPE 2 DIABETES MELLITUS WITH HYPERGLYCEMIA (HCC): Primary | ICD-10-CM

## 2025-06-09 DIAGNOSIS — E78.5 DYSLIPIDEMIA: ICD-10-CM

## 2025-06-09 RX ORDER — PEN NEEDLE, DIABETIC 31 G X1/4"
1 NEEDLE, DISPOSABLE MISCELLANEOUS DAILY
Qty: 100 EACH | Refills: 5 | Status: SHIPPED | OUTPATIENT
Start: 2025-06-09

## 2025-06-09 RX ORDER — ATORVASTATIN CALCIUM 40 MG/1
40 TABLET, FILM COATED ORAL DAILY
Qty: 30 TABLET | Refills: 11 | Status: SHIPPED | OUTPATIENT
Start: 2025-06-09

## 2025-06-09 RX ORDER — METFORMIN HYDROCHLORIDE 500 MG/1
TABLET, EXTENDED RELEASE ORAL
Qty: 120 TABLET | Refills: 11 | Status: SHIPPED | OUTPATIENT
Start: 2025-06-09

## 2025-06-09 RX ORDER — OLMESARTAN MEDOXOMIL 20 MG/1
20 TABLET ORAL DAILY
Qty: 30 TABLET | Refills: 11 | Status: SHIPPED | OUTPATIENT
Start: 2025-06-09

## 2025-06-10 ENCOUNTER — TELEPHONE (OUTPATIENT)
Facility: CLINIC | Age: 65
End: 2025-06-10

## 2025-06-15 ENCOUNTER — RESULTS FOLLOW-UP (OUTPATIENT)
Facility: CLINIC | Age: 65
End: 2025-06-15

## 2025-06-17 ENCOUNTER — HOSPITAL ENCOUNTER (OUTPATIENT)
Facility: HOSPITAL | Age: 65
Discharge: HOME | End: 2025-06-20
Attending: INTERNAL MEDICINE
Payer: COMMERCIAL

## 2025-06-17 VITALS — HEIGHT: 65 IN | BODY MASS INDEX: 44.15 KG/M2 | WEIGHT: 265 LBS

## 2025-06-17 DIAGNOSIS — Z12.31 SCREENING MAMMOGRAM FOR BREAST CANCER: ICD-10-CM

## 2025-06-17 PROCEDURE — 77067 SCR MAMMO BI INCL CAD: CPT

## 2025-07-07 ENCOUNTER — OFFICE VISIT (OUTPATIENT)
Facility: CLINIC | Age: 65
End: 2025-07-07
Payer: COMMERCIAL

## 2025-07-07 VITALS
HEART RATE: 72 BPM | HEIGHT: 67 IN | WEIGHT: 259 LBS | BODY MASS INDEX: 40.65 KG/M2 | TEMPERATURE: 98.2 F | SYSTOLIC BLOOD PRESSURE: 126 MMHG | DIASTOLIC BLOOD PRESSURE: 66 MMHG | RESPIRATION RATE: 16 BRPM | OXYGEN SATURATION: 98 %

## 2025-07-07 DIAGNOSIS — E11.9 CONTROLLED TYPE 2 DIABETES MELLITUS WITHOUT COMPLICATION, WITHOUT LONG-TERM CURRENT USE OF INSULIN (HCC): Primary | ICD-10-CM

## 2025-07-07 DIAGNOSIS — E78.5 DYSLIPIDEMIA: ICD-10-CM

## 2025-07-07 DIAGNOSIS — E66.01 MORBID OBESITY (HCC): ICD-10-CM

## 2025-07-07 DIAGNOSIS — I10 ESSENTIAL HYPERTENSION: ICD-10-CM

## 2025-07-07 DIAGNOSIS — E11.65 UNCONTROLLED TYPE 2 DIABETES MELLITUS WITH HYPERGLYCEMIA (HCC): ICD-10-CM

## 2025-07-07 PROCEDURE — 1123F ACP DISCUSS/DSCN MKR DOCD: CPT | Performed by: INTERNAL MEDICINE

## 2025-07-07 PROCEDURE — 3074F SYST BP LT 130 MM HG: CPT | Performed by: INTERNAL MEDICINE

## 2025-07-07 PROCEDURE — 3078F DIAST BP <80 MM HG: CPT | Performed by: INTERNAL MEDICINE

## 2025-07-07 PROCEDURE — 99214 OFFICE O/P EST MOD 30 MIN: CPT | Performed by: INTERNAL MEDICINE

## 2025-07-07 PROCEDURE — 3046F HEMOGLOBIN A1C LEVEL >9.0%: CPT | Performed by: INTERNAL MEDICINE

## 2025-07-07 SDOH — ECONOMIC STABILITY: FOOD INSECURITY: WITHIN THE PAST 12 MONTHS, THE FOOD YOU BOUGHT JUST DIDN'T LAST AND YOU DIDN'T HAVE MONEY TO GET MORE.: NEVER TRUE

## 2025-07-07 SDOH — ECONOMIC STABILITY: FOOD INSECURITY: WITHIN THE PAST 12 MONTHS, YOU WORRIED THAT YOUR FOOD WOULD RUN OUT BEFORE YOU GOT MONEY TO BUY MORE.: NEVER TRUE

## 2025-07-07 ASSESSMENT — PATIENT HEALTH QUESTIONNAIRE - PHQ9
1. LITTLE INTEREST OR PLEASURE IN DOING THINGS: NOT AT ALL
SUM OF ALL RESPONSES TO PHQ QUESTIONS 1-9: 0
2. FEELING DOWN, DEPRESSED OR HOPELESS: NOT AT ALL
SUM OF ALL RESPONSES TO PHQ QUESTIONS 1-9: 0

## 2025-07-07 NOTE — PROGRESS NOTES
Chief Complaint   Patient presents with    Diabetes     Patient states she is present for a follow up.    Have you been to the ER, urgent care clinic since your last visit?  Hospitalized since your last visit?   NO    Have you seen or consulted any other health care providers outside our system since your last visit?   NO      Have you had a colorectal cancer screening such as a colonoscopy/FIT/Cologuard?    NO    No colonoscopy on file  No cologuard on file  No FIT/FOBT on file   No flexible sigmoidoscopy on file     Have you had a diabetic eye exam?    NO     Date of last diabetic eye exam: 12/12/2019

## 2025-07-10 RX ORDER — PEN NEEDLE, DIABETIC 31 G X1/4"
1 NEEDLE, DISPOSABLE MISCELLANEOUS DAILY
Qty: 100 EACH | Refills: 5 | OUTPATIENT
Start: 2025-07-10

## 2025-07-10 RX ORDER — ATORVASTATIN CALCIUM 40 MG/1
40 TABLET, FILM COATED ORAL DAILY
Qty: 30 TABLET | Refills: 11 | Status: SHIPPED | OUTPATIENT
Start: 2025-07-10

## 2025-07-10 RX ORDER — METFORMIN HYDROCHLORIDE 500 MG/1
TABLET, EXTENDED RELEASE ORAL
Qty: 120 TABLET | Refills: 11 | Status: SHIPPED | OUTPATIENT
Start: 2025-07-10

## 2025-07-12 DIAGNOSIS — I10 ESSENTIAL HYPERTENSION: ICD-10-CM

## 2025-07-16 RX ORDER — OLMESARTAN MEDOXOMIL 20 MG/1
20 TABLET ORAL DAILY
Qty: 30 TABLET | Refills: 11 | Status: SHIPPED | OUTPATIENT
Start: 2025-07-16

## 2025-07-21 NOTE — PROGRESS NOTES
[facility]  [facility] [facility]  [address]  [facility] [address]  Phone:  [phone]  Fax: [phone]       Chief Complaint   Patient presents with    Diabetes   .      SUBJECTIVE:      History of Present Illness  The patient presents for evaluation of diabetes.    She reports an improvement in her condition. She has been adhering to her prescribed medication regimen, which includes metformin. She expresses a desire to continue with her current treatment plan and is open to considering injections if necessary in the future. Her diet primarily consists of plantains, green vegetables, corn, sweet potatoes, salad, green apples, cucumbers, red apples, grapes, and lettuce. She has eliminated bread from her diet. She has previously lost 30 pounds through dietary changes.    SOCIAL HISTORY  She is originally from [facility].         Current Outpatient Medications   Medication Sig Dispense Refill    Semaglutide,0.25 or 0.5MG/DOS, 2 MG/3ML SOPN 0.25 mg weekly x 2 then 0.5 mg weekly thereafter 3 mL 0    Insulin Pen Needle ([employer] PEN NEEDLES) 31G X 6 MM MISC 1 each by Does not apply route daily 100 each 5    olmesartan (BENICAR) 20 MG tablet TAKE 1 TABLET BY MOUTH EVERY DAY 30 tablet 11    metFORMIN (GLUCOPHAGE-XR) 500 MG extended release tablet 2 tablets twice a day with meals 120 tablet 11    atorvastatin (LIPITOR) 40 MG tablet Take 1 tablet by mouth daily 30 tablet 11    losartan (COZAAR) 50 MG tablet Take 1 tablet by mouth daily (Patient not taking: Reported on 7/7/2025)      ofloxacin (FLOXIN) 0.3 % otic solution Place 5 drops in ear(s) daily (Patient not taking: Reported on 7/7/2025)       No current facility-administered medications for this visit.     Past Medical History:   Diagnosis Date    Essential hypertension     Hyperlipidemia     Morbid obesity (HCC)     Type 2 diabetes mellitus (HCC) dx 1/2016    with a1c 6.7%     History reviewed. No pertinent surgical history.  Allergies